# Patient Record
Sex: MALE | Race: WHITE | NOT HISPANIC OR LATINO | Employment: OTHER | URBAN - NONMETROPOLITAN AREA
[De-identification: names, ages, dates, MRNs, and addresses within clinical notes are randomized per-mention and may not be internally consistent; named-entity substitution may affect disease eponyms.]

---

## 2017-05-27 ENCOUNTER — TELEPHONE (OUTPATIENT)
Dept: CARDIOLOGY | Facility: CLINIC | Age: 77
End: 2017-05-27

## 2017-05-27 RX ORDER — CARVEDILOL 3.12 MG/1
3.12 TABLET ORAL 2 TIMES DAILY
Qty: 60 TABLET | Refills: 0 | Status: ON HOLD | OUTPATIENT
Start: 2017-05-27 | End: 2018-11-07

## 2017-07-07 ENCOUNTER — TRANSCRIBE ORDERS (OUTPATIENT)
Dept: GENERAL RADIOLOGY | Facility: CLINIC | Age: 77
End: 2017-07-07

## 2017-07-07 DIAGNOSIS — J44.9 CHRONIC OBSTRUCTIVE PULMONARY DISEASE, UNSPECIFIED COPD TYPE (HCC): Primary | ICD-10-CM

## 2017-07-10 ENCOUNTER — TRANSCRIBE ORDERS (OUTPATIENT)
Dept: GENERAL RADIOLOGY | Facility: CLINIC | Age: 77
End: 2017-07-10

## 2017-07-10 DIAGNOSIS — J98.4 OTHER DISORDERS OF LUNG: Primary | ICD-10-CM

## 2017-08-17 ENCOUNTER — OFFICE VISIT (OUTPATIENT)
Dept: CARDIOLOGY | Facility: CLINIC | Age: 77
End: 2017-08-17

## 2017-08-17 VITALS
SYSTOLIC BLOOD PRESSURE: 94 MMHG | WEIGHT: 147.4 LBS | DIASTOLIC BLOOD PRESSURE: 56 MMHG | BODY MASS INDEX: 22.34 KG/M2 | HEIGHT: 68 IN | OXYGEN SATURATION: 90 % | HEART RATE: 74 BPM

## 2017-08-17 DIAGNOSIS — E78.5 HYPERLIPIDEMIA, UNSPECIFIED HYPERLIPIDEMIA TYPE: ICD-10-CM

## 2017-08-17 DIAGNOSIS — I71.22 ANEURYSM OF AORTIC ARCH (HCC): ICD-10-CM

## 2017-08-17 DIAGNOSIS — I10 ESSENTIAL HYPERTENSION: ICD-10-CM

## 2017-08-17 DIAGNOSIS — I25.10 CORONARY ARTERY DISEASE INVOLVING NATIVE CORONARY ARTERY OF NATIVE HEART WITHOUT ANGINA PECTORIS: Primary | ICD-10-CM

## 2017-08-17 PROCEDURE — 99213 OFFICE O/P EST LOW 20 MIN: CPT | Performed by: INTERNAL MEDICINE

## 2017-08-17 RX ORDER — ALENDRONATE SODIUM 70 MG/1
70 TABLET ORAL DAILY
Refills: 6 | COMMUNITY
Start: 2017-06-12

## 2017-08-17 RX ORDER — LISINOPRIL 20 MG/1
20 TABLET ORAL DAILY
Qty: 30 TABLET | Refills: 6 | Status: SHIPPED | OUTPATIENT
Start: 2017-08-17 | End: 2017-08-30

## 2017-08-17 RX ORDER — ATORVASTATIN CALCIUM 20 MG/1
20 TABLET, FILM COATED ORAL NIGHTLY
Refills: 6 | COMMUNITY
Start: 2017-07-11 | End: 2017-08-30

## 2017-08-17 RX ORDER — TAMSULOSIN HYDROCHLORIDE 0.4 MG/1
1 CAPSULE ORAL DAILY
Refills: 6 | COMMUNITY
Start: 2017-07-11

## 2017-08-17 RX ORDER — TIOTROPIUM BROMIDE 18 UG/1
CAPSULE ORAL; RESPIRATORY (INHALATION)
Refills: 6 | COMMUNITY
Start: 2017-07-11

## 2017-08-17 RX ORDER — LISINOPRIL 40 MG/1
40 TABLET ORAL DAILY
Refills: 6 | COMMUNITY
Start: 2017-07-11 | End: 2017-08-17 | Stop reason: SDUPTHER

## 2017-08-17 RX ORDER — ASPIRIN 81 MG/1
81 TABLET ORAL DAILY
COMMUNITY

## 2017-08-17 RX ORDER — CARVEDILOL 3.12 MG/1
3.12 TABLET ORAL 2 TIMES DAILY WITH MEALS
Refills: 7 | COMMUNITY
Start: 2017-07-28

## 2017-08-17 RX ORDER — HYDROCHLOROTHIAZIDE 12.5 MG/1
12.5 CAPSULE, GELATIN COATED ORAL EVERY MORNING
COMMUNITY

## 2017-08-17 RX ORDER — PHENOL 1.4 %
600 AEROSOL, SPRAY (ML) MUCOUS MEMBRANE 2 TIMES DAILY WITH MEALS
COMMUNITY

## 2017-08-17 RX ORDER — AMLODIPINE BESYLATE 10 MG/1
10 TABLET ORAL DAILY
Refills: 6 | COMMUNITY
Start: 2017-07-11

## 2017-08-21 PROBLEM — I10 ESSENTIAL HYPERTENSION: Status: ACTIVE | Noted: 2017-08-21

## 2017-08-21 PROBLEM — E78.5 HYPERLIPIDEMIA: Status: ACTIVE | Noted: 2017-08-21

## 2017-08-21 PROBLEM — I25.10 CORONARY ARTERY DISEASE INVOLVING NATIVE CORONARY ARTERY OF NATIVE HEART WITHOUT ANGINA PECTORIS: Status: ACTIVE | Noted: 2017-08-21

## 2017-08-21 PROBLEM — I71.22 ANEURYSM OF AORTIC ARCH (HCC): Status: ACTIVE | Noted: 2017-08-21

## 2017-09-05 ENCOUNTER — TELEPHONE (OUTPATIENT)
Dept: CARDIAC SURGERY | Facility: CLINIC | Age: 77
End: 2017-09-05

## 2017-09-05 NOTE — TELEPHONE ENCOUNTER
"CALLED PT TO RESCHEDULE APPOINTMENT BECAUSE HE MISSED TODAY'S APPOINTMENT. SPOKE TO HIS WIFE AND SHE STATES \"WE ARE TO OLD TO MAKE THAT DRIVE TO [place]\" AND WISHES TO CANCEL APT AT THIS TIME. SO I INSTRUCTED PT WIFE TO CALL DR SIERRA OFFICE SO THAT OTHER ARRANGEMENTS CAN POSSIBLY BE MADE.   "

## 2017-09-26 ENCOUNTER — OFFICE VISIT (OUTPATIENT)
Dept: CARDIAC SURGERY | Facility: CLINIC | Age: 77
End: 2017-09-26

## 2017-09-26 VITALS
BODY MASS INDEX: 21.82 KG/M2 | WEIGHT: 144 LBS | DIASTOLIC BLOOD PRESSURE: 70 MMHG | HEIGHT: 68 IN | RESPIRATION RATE: 20 BRPM | HEART RATE: 79 BPM | TEMPERATURE: 98.2 F | OXYGEN SATURATION: 90 % | SYSTOLIC BLOOD PRESSURE: 125 MMHG

## 2017-09-26 DIAGNOSIS — J43.8 OTHER EMPHYSEMA (HCC): ICD-10-CM

## 2017-09-26 DIAGNOSIS — I10 ESSENTIAL HYPERTENSION: ICD-10-CM

## 2017-09-26 DIAGNOSIS — I25.10 CORONARY ARTERY DISEASE INVOLVING NATIVE CORONARY ARTERY OF NATIVE HEART WITHOUT ANGINA PECTORIS: ICD-10-CM

## 2017-09-26 DIAGNOSIS — R06.09 DYSPNEA ON EXERTION: ICD-10-CM

## 2017-09-26 DIAGNOSIS — I71.22 ANEURYSM OF AORTIC ARCH (HCC): Primary | ICD-10-CM

## 2017-09-26 PROCEDURE — 99204 OFFICE O/P NEW MOD 45 MIN: CPT | Performed by: THORACIC SURGERY (CARDIOTHORACIC VASCULAR SURGERY)

## 2017-10-01 PROBLEM — R06.09 DYSPNEA ON EXERTION: Status: ACTIVE | Noted: 2017-10-01

## 2017-10-01 PROBLEM — J43.8 OTHER EMPHYSEMA (HCC): Status: ACTIVE | Noted: 2017-10-01

## 2017-10-01 NOTE — PROGRESS NOTES
9/26/17    Reason for consultation:     Evaluation of thoracic aortic aneurysm    Chief Complaint     Same    History of Present Illness:       Dear Dr. Matamoros and Colleagues,  It was nice to see [place] Seema Gonzalez in consultation at your request. He is a 77 y.o. male with a history of CAD and PCIs and COPD on home O2 who was found incidentally a saccular distal arch aortic aneurysm. He denies chest or upper back pain, syncope, TIA, hoarseness, fever, chills or embolic signs to LEs. His recent MRA showed a 3.1 cm x 2.2 cm saccular arch aneurysm without rupture or surrounding inflammation. He has no family history of aneurysms or dissections..     Patient Active Problem List   Diagnosis   • Aneurysm of aortic arch   • Coronary artery disease involving native coronary artery of native heart without angina pectoris   • Hyperlipidemia   • Essential hypertension   • Other emphysema   • Dyspnea on exertion       Past Medical History:   Diagnosis Date   • Asthma    • Carotid bruit    • COPD (chronic obstructive pulmonary disease)     on supplemental O2      • Coronary arteriosclerosis     recent stent to RCA patent      • Degenerative joint disease involving multiple joints    • Emphysema lung    • Essential hypertension    • Hiatal hernia    • History of EKG 05/05/2012   • Hyperlipidemia    • Impaired fasting glucose    • 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 Total occlusion of the distal right coronary artery   • COLONOSCOPY  2009   • CORONARY STENT PLACEMENT      x1   • ECHO - CONVERTED  04/16/2012    NRL LV syst func. EF above 65%. There is no evidence of regional wall motion abn.  Mild concentric LVH.There is moderate TR valve regurg.No evodence of pericardial effusion   • KYPHOPLASTY      Percutaneous vertebral augmentation (kyphoplasty) (1)         No Known Allergies      Current Outpatient Prescriptions:   •  alendronate (FOSAMAX) 70 MG tablet, Take 70 mg by mouth Daily., Disp: , Rfl: 6  •  amLODIPine (NORVASC) 10 MG tablet, 10 mg Daily., Disp: , Rfl: 6  •  amLODIPine (NORVASC) 10 MG tablet, Take 10 mg by mouth., Disp: , Rfl:   •  aspirin 81 MG EC tablet, Take 81 mg by mouth Daily., Disp: , Rfl:   •  atorvastatin (LIPITOR) 20 MG tablet, Take 20 mg by mouth., Disp: , Rfl:   •  calcium carbonate (OS-NGOC) 600 MG tablet, Take 600 mg by mouth 2 (Two) Times a Day With Meals., Disp: , Rfl:   •  carvedilol (COREG) 3.125 MG tablet, 3.125 mg 2 (Two) Times a Day With Meals., Disp: , Rfl: 7  •  esomeprazole (NEXIUM) 40 MG capsule, Take 40 mg by mouth., Disp: , Rfl:   •  fluticasone-salmeterol (ADVAIR) 500-50 MCG/DOSE DISKUS, Inhale 1 puff., Disp: , Rfl:   •  hydrochlorothiazide (MICROZIDE) 12.5 MG capsule, Take 12.5 mg by mouth Every Morning., Disp: , Rfl:   •  lisinopril (PRINIVIL,ZESTRIL) 40 MG tablet, Take 40 mg by mouth., Disp: , Rfl:   •  PROAIR  (90 Base) MCG/ACT inhaler, , Disp: , Rfl: 6  •  SPIRIVA HANDIHALER 18 MCG per inhalation capsule, , Disp: , Rfl: 6  •  tamsulosin (FLOMAX) 0.4 MG capsule 24 hr capsule, 1 capsule Daily., Disp: , Rfl: 6    Social History     Social History   • Marital status:      Spouse name: N/A   • Number of children: N/A   • Years of education: N/A     Occupational History   • Not on file.     Social History Main Topics   • Smoking status: Former Smoker   • Smokeless tobacco: Current User     Types: Snuff   • Alcohol use No   • Drug use: No   • Sexual activity: Defer     Other Topics Concern   • Not on file     Social History Narrative       Family History   Problem Relation Age of Onset   • Cancer Mother    • Heart disease Father    • Diabetes Other       Review of Systems   Constitutional: Positive for fatigue. Negative for fever.   Respiratory: Positive for shortness of breath and wheezing. Negative for chest tightness.    Cardiovascular: Negative for chest pain and leg swelling.   Neurological: Negative for syncope.   All other systems reviewed and are negative.      Physical Exam:    Vital Signs:  Weight: 144 lb (65.3 kg)   Body mass index is 21.9 kg/(m^2).  Temp: 98.2 °F (36.8 °C)   Heart Rate: 79   BP: 125/70     Physical Exam   Constitutional: He is oriented to person, place, and time. He appears well-developed and well-nourished.   HENT:   Head: Normocephalic and atraumatic.   Nose: Nose normal.   Mouth/Throat: Oropharynx is clear and moist.   Eyes: Conjunctivae are normal. Pupils are equal, round, and reactive to light.   Neck: Normal range of motion. Neck supple. No JVD present. No thyromegaly present.   Cardiovascular: Normal rate, regular rhythm, normal heart sounds and intact distal pulses.  PMI is not displaced.  Exam reveals no gallop and no friction rub.    No murmur heard.  Pulses:       Radial pulses are 2+ on the right side, and 2+ on the left side.        Dorsalis pedis pulses are 2+ on the right side, and 2+ on the left side.   Pulmonary/Chest: Effort normal. No accessory muscle usage. No respiratory distress. He has decreased breath sounds in the left lower field. He has wheezes in the right lower field. He has no rales.   Abdominal: Soft. Bowel sounds are normal. He exhibits no distension and no mass. There is no tenderness.   Musculoskeletal: Normal range of motion. He exhibits no tenderness or deformity.   Lymphadenopathy:     He has no cervical adenopathy.   Neurological: He is alert and oriented to person, place, and time. He has normal reflexes.   Skin: Skin is warm and dry. No rash noted. No erythema.   Psychiatric: He has a normal mood and affect. His behavior is normal.   No groin or neck adenomegalies    Relevant studies (other  than HPI)        Assessment:     Problem List Items Addressed This Visit        Cardiovascular and Mediastinum    Aneurysm of aortic arch - Primary    Coronary artery disease involving native coronary artery of native heart without angina pectoris    Essential hypertension       Respiratory    Other emphysema    Dyspnea on exertion        Saccular aortic arch aneurysm, asymptomatic.  Multiple co morbidities    Recommendation/Plan:     I am not sure a stent graft can be placed without debranching both, the LCCA and LSCA. Open surgery is not an option. I discuss the possibility of TEVAR with patient and if feasible, he will consider it. He may need a CTA for further planning.    Thank you for allowing me to participate in his care.    Regards,    Jah Buenrostro M.D.

## 2017-10-02 ENCOUNTER — TELEPHONE (OUTPATIENT)
Dept: CARDIAC SURGERY | Facility: CLINIC | Age: 77
End: 2017-10-02

## 2017-10-02 NOTE — TELEPHONE ENCOUNTER
I spoke with patients wife trying to find out where his most recent labs were drawn. I then called [facility] office [phone] and they will send labs drawn 9/20/17 for Dr Buenrostro to review

## 2017-10-09 ENCOUNTER — TELEPHONE (OUTPATIENT)
Dept: CARDIAC SURGERY | Facility: CLINIC | Age: 77
End: 2017-10-09

## 2017-10-09 NOTE — TELEPHONE ENCOUNTER
I was able to reach Mrs Miller and confirmed her husbands appointment at [facility] 10/11/17 at 11:30 which was agreeable to them

## 2017-10-09 NOTE — TELEPHONE ENCOUNTER
I have made several attempts to reach Mr Miller to let him know the CTA with reconstruction Dr Buenrostro wanted has been ordered and scheduled for 10/10/17 at 11:30 [facility]. The phone number I have reached him on in the past does not have voicemail and I have not been able to get an answer when I call. I'll continue to try to reach him

## 2017-10-11 ENCOUNTER — HOSPITAL ENCOUNTER (OUTPATIENT)
Dept: CT IMAGING | Facility: HOSPITAL | Age: 77
Discharge: HOME | End: 2017-10-11
Admitting: THORACIC SURGERY (CARDIOTHORACIC VASCULAR SURGERY)

## 2017-10-11 DIAGNOSIS — I71.20 THORACIC AORTIC ANEURYSM (HCC): ICD-10-CM

## 2017-10-11 PROCEDURE — 0 IOPAMIDOL PER 1 ML: Performed by: THORACIC SURGERY (CARDIOTHORACIC VASCULAR SURGERY)

## 2017-10-11 PROCEDURE — 71275 CT ANGIOGRAPHY CHEST: CPT

## 2017-10-11 RX ADMIN — IOPAMIDOL 90 ML: 755 INJECTION, SOLUTION INTRAVENOUS at 13:15

## 2017-10-19 ENCOUNTER — TELEPHONE (OUTPATIENT)
Dept: CARDIAC SURGERY | Facility: CLINIC | Age: 77
End: 2017-10-19

## 2017-10-19 NOTE — TELEPHONE ENCOUNTER
I returned Mrs Miller's call and she was asking if Dr Buenrostro had reviewed the recent test results. I believe he has but I'm not sure what the plan is for Mr Miller. I let her know I will discuss with Dr Buenrostro and call her back in the next day or two

## 2017-10-20 ENCOUNTER — TELEPHONE (OUTPATIENT)
Dept: CARDIAC SURGERY | Facility: CLINIC | Age: 77
End: 2017-10-20

## 2017-10-20 NOTE — TELEPHONE ENCOUNTER
Dr Buenrostro asked me to call and let Mr Miller know that he feels surgery is to high risk at this time due to the location of the aneurysm and the co-morbidities. He has reviewed the recent CTA and thinks it is best to continue to follow with Dr Matamoros. He would like to see patient back in office in one year with a CTA of the chest , which the patient can have completed at [facility]. I spoke with Mrs Miller yesterday and she asked me to leave a message on their answering machine if I was unable to reach them, which I did. I left our office number and asked that they call back with any questions

## 2017-10-26 ENCOUNTER — HOSPITAL ENCOUNTER (EMERGENCY)
Facility: HOSPITAL | Age: 77
Discharge: HOME | End: 2017-10-26
Attending: EMERGENCY MEDICINE | Admitting: EMERGENCY MEDICINE

## 2017-10-26 VITALS
BODY MASS INDEX: 21.22 KG/M2 | HEART RATE: 95 BPM | HEIGHT: 68 IN | WEIGHT: 140 LBS | OXYGEN SATURATION: 94 % | DIASTOLIC BLOOD PRESSURE: 55 MMHG | SYSTOLIC BLOOD PRESSURE: 119 MMHG | RESPIRATION RATE: 20 BRPM | TEMPERATURE: 98.2 F

## 2017-10-26 DIAGNOSIS — J44.9 CHRONIC OBSTRUCTIVE PULMONARY DISEASE, UNSPECIFIED COPD TYPE (HCC): ICD-10-CM

## 2017-10-26 DIAGNOSIS — R04.0 EPISTAXIS: Primary | ICD-10-CM

## 2017-10-26 LAB
APTT PPP: 30.3 SECONDS (ref 20–40.3)
BASOPHILS # BLD AUTO: 0.1 10*3/MM3 (ref 0–0.2)
BASOPHILS NFR BLD AUTO: 0.7 % (ref 0–2)
DEPRECATED RDW RBC AUTO: 48.3 FL (ref 35.1–43.9)
EOSINOPHIL # BLD AUTO: 0.21 10*3/MM3 (ref 0–0.7)
EOSINOPHIL NFR BLD AUTO: 1.4 % (ref 0–7)
ERYTHROCYTE [DISTWIDTH] IN BLOOD BY AUTOMATED COUNT: 14 % (ref 11.5–14.5)
HCT VFR BLD AUTO: 37.6 % (ref 39–49)
HGB BLD-MCNC: 12.6 G/DL (ref 13.7–17.3)
IMM GRANULOCYTES # BLD: 0.06 10*3/MM3 (ref 0–0.02)
IMM GRANULOCYTES NFR BLD: 0.4 % (ref 0–0.5)
INR PPP: 1.16 (ref 0.8–1.2)
LYMPHOCYTES # BLD AUTO: 1.83 10*3/MM3 (ref 0.6–4.2)
LYMPHOCYTES NFR BLD AUTO: 12.5 % (ref 10–50)
MCH RBC QN AUTO: 32.1 PG (ref 26.5–34)
MCHC RBC AUTO-ENTMCNC: 33.5 G/DL (ref 31.5–36.3)
MCV RBC AUTO: 95.9 FL (ref 80–98)
MONOCYTES # BLD AUTO: 0.84 10*3/MM3 (ref 0–0.9)
MONOCYTES NFR BLD AUTO: 5.7 % (ref 0–12)
NEUTROPHILS # BLD AUTO: 11.6 10*3/MM3 (ref 2–8.6)
NEUTROPHILS NFR BLD AUTO: 79.3 % (ref 37–80)
PLATELET # BLD AUTO: 145 10*3/MM3 (ref 150–450)
PMV BLD AUTO: 10 FL (ref 8–12)
PROTHROMBIN TIME: 14.8 SECONDS (ref 11.1–15.3)
RBC # BLD AUTO: 3.92 10*6/MM3 (ref 4.37–5.74)
WBC NRBC COR # BLD: 14.64 10*3/MM3 (ref 3.2–9.8)

## 2017-10-26 PROCEDURE — 85610 PROTHROMBIN TIME: CPT | Performed by: EMERGENCY MEDICINE

## 2017-10-26 PROCEDURE — 85025 COMPLETE CBC W/AUTO DIFF WBC: CPT | Performed by: EMERGENCY MEDICINE

## 2017-10-26 PROCEDURE — 30901 CONTROL OF NOSEBLEED: CPT

## 2017-10-26 PROCEDURE — 85730 THROMBOPLASTIN TIME PARTIAL: CPT | Performed by: EMERGENCY MEDICINE

## 2017-10-26 PROCEDURE — 99284 EMERGENCY DEPT VISIT MOD MDM: CPT

## 2017-10-26 RX ORDER — AMOXICILLIN AND CLAVULANATE POTASSIUM 875; 125 MG/1; MG/1
1 TABLET, FILM COATED ORAL 2 TIMES DAILY
Qty: 14 TABLET | Refills: 0 | Status: SHIPPED | OUTPATIENT
Start: 2017-10-26 | End: 2017-11-02

## 2017-10-26 RX ORDER — SODIUM CHLORIDE 0.9 % (FLUSH) 0.9 %
10 SYRINGE (ML) INJECTION AS NEEDED
Status: DISCONTINUED | OUTPATIENT
Start: 2017-10-26 | End: 2017-10-26 | Stop reason: HOSPADM

## 2017-10-26 RX ORDER — IPRATROPIUM BROMIDE AND ALBUTEROL SULFATE 2.5; .5 MG/3ML; MG/3ML
3 SOLUTION RESPIRATORY (INHALATION)
Status: DISCONTINUED | OUTPATIENT
Start: 2017-10-26 | End: 2017-10-26 | Stop reason: HOSPADM

## 2017-10-26 RX ADMIN — SILVER NITRATE APPLICATORS: 25; 75 STICK TOPICAL at 17:00

## 2017-10-26 RX ADMIN — PHENYLEPHRINE HYDROCHLORIDE 2 SPRAY: 0.5 SPRAY NASAL at 16:00

## 2017-10-26 NOTE — DISCHARGE INSTRUCTIONS
Return ED 10.29.2017 for Rhino Rocket removal.  Return ED fever, pain, bleeding, shortness of air, worse condition, other concerns.  Hold Aspirin pending ENT followup.

## 2017-10-26 NOTE — ED PROVIDER NOTES
Subjective   HPI Comments: 76yo male pmh significant htn/o2 dependent copd presents ED c/o 4hr hx left nares epistaxis onset after blowing nose.  ROS otherwise noncontributory.    Patient is a 77 y.o. male presenting with nosebleeds.   Nose Bleed   Location:  L nare  Severity:  Moderate  Duration:  4 hours  Timing:  Constant  Chronicity:  New  Relieved by:  Nothing  Worsened by:  Nothing  Associated symptoms: congestion        Review of Systems   Constitutional: Negative.    HENT: Positive for congestion and nosebleeds.    Eyes: Negative.    Respiratory: Negative.    Cardiovascular: Negative.    Gastrointestinal: Negative.    Endocrine: Negative.    Psychiatric/Behavioral: Negative for suicidal ideas.       Past Medical History:   Diagnosis Date   • COPD (chronic obstructive pulmonary disease)    • Hypertension        No Known Allergies    Past Surgical History:   Procedure Laterality Date   • CORONARY ANGIOPLASTY WITH STENT PLACEMENT         History reviewed. No pertinent family history.    Social History     Social History   • Marital status:      Spouse name: N/A   • Number of children: N/A   • Years of education: N/A     Social History Main Topics   • Smoking status: Never Smoker   • Smokeless tobacco: Never Used   • Alcohol use No   • Drug use: No   • Sexual activity: Not Asked     Other Topics Concern   • None     Social History Narrative   • None           Objective   Physical Exam   Constitutional: He is oriented to person, place, and time. He appears well-nourished.   HENT:   Head: Normocephalic and atraumatic.   Right Ear: External ear normal.   Left Ear: External ear normal.   Nose: Epistaxis is observed.       Mouth/Throat: Oropharynx is clear and moist.   Eyes: Pupils are equal, round, and reactive to light.   Neck: Neck supple. No JVD present. No tracheal deviation present.   Cardiovascular: Normal rate, regular rhythm, normal heart sounds and intact distal pulses.  Exam reveals no gallop and no  friction rub.    No murmur heard.  Pulmonary/Chest: Effort normal and breath sounds normal. He has no wheezes. He has no rales.   Abdominal: Soft. Bowel sounds are normal. There is no tenderness. There is no rebound and no guarding.   Musculoskeletal: He exhibits no edema.   Lymphadenopathy:     He has no cervical adenopathy.   Neurological: He is alert and oriented to person, place, and time.   Skin: Skin is warm and dry.   Nursing note and vitals reviewed.      Epistaxis Management  Date/Time: 10/26/2017 5:11 PM  Performed by: DEVEN VELASCO  Authorized by: DEVEN VELASCO   Consent: Verbal consent obtained.    Sedation:  Patient sedated: no  Treatment site: left anterior  Repair method: silver nitrate and nasal balloon  Post-procedure assessment: bleeding stopped  Treatment complexity: simple  Patient tolerance: Patient tolerated the procedure well with no immediate complications               ED Course  ED Course   Comment By Time   Dr. Silva consulted. Will see patient outpatient. Plan ED discharge if labs stable et no further hemorrhage. Deven Velasco MD 10/26 7322      Labs Reviewed   CBC WITH AUTO DIFFERENTIAL - Abnormal; Notable for the following:        Result Value    WBC 14.64 (*)     RBC 3.92 (*)     Hemoglobin 12.6 (*)     Hematocrit 37.6 (*)     RDW-SD 48.3 (*)     Platelets 145 (*)     Neutrophils, Absolute 11.60 (*)     Immature Grans, Absolute 0.06 (*)     All other components within normal limits   PROTIME-INR - Normal    Narrative:     Therapeutic range for most indications is 2.0-3.0 INR,  or 2.5-3.5 for mechanical heart valves.   APTT - Normal    Narrative:     The recommended Heparin therapeutic range is 68-97 seconds.   CBC AND DIFFERENTIAL    Narrative:     The following orders were created for panel order CBC & Differential.  Procedure                               Abnormality         Status                     ---------                               -----------         ------                      CBC Auto Differential[[id number]]        Abnormal            Final result                 Please view results for these tests on the individual orders.                 MDM    Final diagnoses:   Epistaxis   Chronic obstructive pulmonary disease, unspecified COPD type            Sha Mckee MD  10/26/17 6460

## 2017-10-26 NOTE — ED NOTES
Nose clamp and gauze placed on patient at this time. Patient states this has happened before. Patient does have to wear O2 at home and states he feels like his nose is dry. Patient states that his nose has been bleeding for over 4 hours now.     Va Johnson RN  10/26/17 8260

## 2017-10-29 ENCOUNTER — HOSPITAL ENCOUNTER (EMERGENCY)
Facility: HOSPITAL | Age: 77
Discharge: HOME | End: 2017-10-29
Attending: FAMILY MEDICINE | Admitting: FAMILY MEDICINE

## 2017-10-29 VITALS
WEIGHT: 143 LBS | RESPIRATION RATE: 24 BRPM | SYSTOLIC BLOOD PRESSURE: 116 MMHG | HEIGHT: 68 IN | BODY MASS INDEX: 21.67 KG/M2 | OXYGEN SATURATION: 91 % | DIASTOLIC BLOOD PRESSURE: 56 MMHG | TEMPERATURE: 97.9 F | HEART RATE: 93 BPM

## 2017-10-29 DIAGNOSIS — R04.0 ANTERIOR EPISTAXIS: Primary | ICD-10-CM

## 2017-10-29 PROCEDURE — 99201: CPT

## 2017-11-03 ENCOUNTER — OFFICE VISIT (OUTPATIENT)
Dept: OTOLARYNGOLOGY | Facility: CLINIC | Age: 77
End: 2017-11-03

## 2017-11-03 VITALS — BODY MASS INDEX: 22.13 KG/M2 | WEIGHT: 146 LBS | HEIGHT: 68 IN | TEMPERATURE: 96.5 F

## 2017-11-03 DIAGNOSIS — R04.0 EPISTAXIS: Primary | ICD-10-CM

## 2017-11-03 DIAGNOSIS — J34.2 NASAL SEPTAL DEVIATION: ICD-10-CM

## 2017-11-03 PROCEDURE — 31231 NASAL ENDOSCOPY DX: CPT | Performed by: OTOLARYNGOLOGY

## 2017-11-03 PROCEDURE — 99203 OFFICE O/P NEW LOW 30 MIN: CPT | Performed by: OTOLARYNGOLOGY

## 2017-11-03 RX ORDER — AMOXICILLIN AND CLAVULANATE POTASSIUM 875; 125 MG/1; MG/1
1 TABLET, FILM COATED ORAL 2 TIMES DAILY
Status: ON HOLD | COMMUNITY
End: 2018-11-07

## 2017-11-03 NOTE — PROGRESS NOTES
Subjective   Montana Gonzalez is a 77 y.o. male.   Chief complaint follow-up left epistaxis seen in emergency room previously  History of Present Illness     Patient is had intermittent nosebleeds all his life but says he has severe nosebleed about a week ago once the emergency room where she nose is packed is no cell congestion uncomfortable since then the packing was take A few days later.  He's had no further bleeding no facial swelling.    The following portions of the patient's history were reviewed and updated as appropriate: allergies, current medications, past family history, past medical history, past social history, past surgical history and problem list.      Montana Gonzalez reports that he has quit smoking. His smokeless tobacco use includes Snuff. He reports that he does not drink alcohol or use illicit drugs.  Patient is not a tobacco user and has been counseled for use of tobacco products    Family History   Problem Relation Age of Onset   • Cancer Mother    • Heart disease Father    • Diabetes Other          Current Outpatient Prescriptions:   •  amLODIPine (NORVASC) 10 MG tablet, 10 mg Daily., Disp: , Rfl: 6  •  amLODIPine (NORVASC) 10 MG tablet, Take 10 mg by mouth., Disp: , Rfl:   •  amoxicillin-clavulanate (AUGMENTIN) 875-125 MG per tablet, Take 1 tablet by mouth 2 (Two) Times a Day., Disp: , Rfl:   •  aspirin 81 MG EC tablet, Take 81 mg by mouth Daily., Disp: , Rfl:   •  atorvastatin (LIPITOR) 20 MG tablet, Take 20 mg by mouth., Disp: , Rfl:   •  calcium carbonate (OS-NGOC) 600 MG tablet, Take 600 mg by mouth 2 (Two) Times a Day With Meals., Disp: , Rfl:   •  carvedilol (COREG) 3.125 MG tablet, 3.125 mg 2 (Two) Times a Day With Meals., Disp: , Rfl: 7  •  esomeprazole (NEXIUM) 40 MG capsule, Take 40 mg by mouth., Disp: , Rfl:   •  fluticasone-salmeterol (ADVAIR) 500-50 MCG/DOSE DISKUS, Inhale 1 puff., Disp: , Rfl:   •  hydrochlorothiazide (MICROZIDE) 12.5 MG capsule, Take 12.5 mg by  mouth Every Morning., Disp: , Rfl:   •  lisinopril (PRINIVIL,ZESTRIL) 40 MG tablet, Take 40 mg by mouth., Disp: , Rfl:   •  PROAIR  (90 Base) MCG/ACT inhaler, , Disp: , Rfl: 6  •  SPIRIVA HANDIHALER 18 MCG per inhalation capsule, , Disp: , Rfl: 6  •  tamsulosin (FLOMAX) 0.4 MG capsule 24 hr capsule, 1 capsule Daily., Disp: , Rfl: 6  •  alendronate (FOSAMAX) 70 MG tablet, Take 70 mg by mouth Daily., Disp: , Rfl: 6    No Known Allergies    Past Medical History:   Diagnosis Date   • Aorta aneurysm    • Asthma    • Bleeding tendency    • Carotid bruit    • COPD (chronic obstructive pulmonary disease)     on supplemental O2      • Coronary arteriosclerosis     recent stent to RCA patent      • Degenerative joint disease involving multiple joints    • Emphysema lung    • Essential hypertension    • Hiatal hernia    • History of EKG 05/05/2012   • Hyperlipidemia    • Impaired fasting glucose    • 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   HENT: Positive for hearing loss and nosebleeds. Negative for ear discharge, ear pain and sore throat.    Eyes: Positive for visual disturbance.   Respiratory: Positive for cough and shortness of breath.    Musculoskeletal: Positive for arthralgias.   All other systems reviewed and are negative.          Objective   Physical Exam   Constitutional: He is oriented to person, place, and time. He appears well-developed and well-nourished.   HENT:   Head: Normocephalic and atraumatic.   Right Ear: Tympanic membrane, external ear and ear canal normal.   Left Ear: Tympanic membrane, external ear and ear canal normal.   Nose: Nasal deformity and septal deviation present. No mucosal edema, rhinorrhea or nasal septal hematoma. No epistaxis.  Right sinus exhibits no maxillary sinus tenderness and no frontal sinus tenderness. Left sinus exhibits no maxillary sinus tenderness and no frontal sinus tenderness.       Mouth/Throat: Uvula is midline and oropharynx is clear and moist. No trismus in the jaw. Normal dentition. No oropharyngeal exudate or posterior oropharyngeal edema.   Eyes: Conjunctivae and EOM are normal. Pupils are equal, round, and reactive to light.   Neck: Normal range of motion. Neck supple. No JVD present. No tracheal deviation present. No thyromegaly present.   Cardiovascular: Normal rate.    Pulmonary/Chest: Effort normal.   Musculoskeletal: Normal range of motion.   Lymphadenopathy:        Head (right side): No submental, no submandibular, no tonsillar, no preauricular, no posterior auricular and no occipital adenopathy present.        Head (left side): No submental, no submandibular, no tonsillar, no preauricular, no posterior auricular and no occipital adenopathy present.     He has no cervical adenopathy.        Right cervical: No superficial cervical, no deep cervical and no posterior cervical adenopathy present.       Left cervical: No superficial cervical, no deep cervical and no posterior cervical adenopathy present.   Neurological: He is alert and oriented to person, place, and time. No cranial nerve deficit.   Skin: Skin is warm.   Psychiatric: He has a normal mood and affect. His speech is normal and behavior is normal. Thought content normal.   Nursing note and vitals reviewed.        Procedure note nasal endoscopy was done for the indication of epistaxis the patient feels like his nose is abnormal since he's had his nose packed.  After getting consent local anesthetic and Afrin was placed and nose.  Endoscopy was carried out no polyps are seen there is dry crusting abrasion on the inferior turbinate extending to about snf past the nose.  Patient does have a deviated septum more on the right than the left but there is no  actual bleeding site seen and no polyps or active bleeding noted            Assessment/Plan   Montana was seen today for nose bleed.    Diagnoses and all orders for this visit:    Epistaxis    Nasal septal deviation      Just the patient come back in a few weeks to reevaluate.   Scan asked strategies to help minimize nosebleeds including the medication with his oxygen and  suggested a humidifier at home he and his wife.  Supports a nasal saline irrigation.  Acid not bending or lifting for at least 5 more days.  Call for questions or problems otherwise will reassess.  He's not interested in any surgery to help his nasal breathing and deviated septum and we'll be happy to see him he has further problems with his bleeding.

## 2017-11-08 ENCOUNTER — TELEPHONE (OUTPATIENT)
Dept: OTOLARYNGOLOGY | Facility: CLINIC | Age: 77
End: 2017-11-08

## 2017-11-08 NOTE — TELEPHONE ENCOUNTER
----- Message from Sloane Velásquez sent at 11/8/2017 10:36 AM CST -----  Contact: [phone]  WANTS TO KNOW WHEN HE CAN START BACK ON HIS ASPIRIN  WAS SEEN LAST WEEK FOR NOSEBLEEDS    Called patient to let them know that Dr. Silva said he could start his aspirin back on Monday.    They are to call our office if they have any questions or concerns.

## 2018-01-03 ENCOUNTER — APPOINTMENT (OUTPATIENT)
Dept: CT IMAGING | Facility: HOSPITAL | Age: 78
End: 2018-01-03

## 2018-01-03 ENCOUNTER — HOSPITAL ENCOUNTER (EMERGENCY)
Facility: HOSPITAL | Age: 78
Discharge: HOME | End: 2018-01-03
Attending: EMERGENCY MEDICINE | Admitting: EMERGENCY MEDICINE

## 2018-01-03 VITALS
HEIGHT: 68 IN | WEIGHT: 140 LBS | OXYGEN SATURATION: 92 % | SYSTOLIC BLOOD PRESSURE: 109 MMHG | RESPIRATION RATE: 18 BRPM | TEMPERATURE: 98.9 F | BODY MASS INDEX: 21.22 KG/M2 | DIASTOLIC BLOOD PRESSURE: 55 MMHG | HEART RATE: 91 BPM

## 2018-01-03 DIAGNOSIS — M54.2 NECK PAIN: Primary | ICD-10-CM

## 2018-01-03 DIAGNOSIS — M62.838 CERVICAL PARASPINAL MUSCLE SPASM: ICD-10-CM
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
## 2018-01-03 PROCEDURE — 99283 EMERGENCY DEPT VISIT LOW MDM: CPT

## 2018-01-03 PROCEDURE — 72125 CT NECK SPINE W/O DYE: CPT

## 2018-01-03 RX ORDER — ACETAMINOPHEN 500 MG
500 TABLET ORAL EVERY 6 HOURS PRN
Qty: 20 TABLET | Refills: 0 | Status: SHIPPED | OUTPATIENT
Start: 2018-01-03 | End: 2018-11-09 | Stop reason: HOSPADM

## 2018-01-03 RX ORDER — CYCLOBENZAPRINE HCL 5 MG
5 TABLET ORAL 3 TIMES DAILY PRN
Qty: 9 TABLET | Refills: 0 | Status: SHIPPED | OUTPATIENT
Start: 2018-01-03
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
## 2018-02-08 NOTE — PROGRESS NOTES
"Refusing 02 sat check due to \"fingers all cracked and bleeding\", no bleeding noted.  " Chief complaint : Coronary artery disease    History of Present Illness very pleasant 77-year-old gentleman who comes today for follow-up and cardiac evaluation.  Patient was told that he has an aneurysm that was incidentally discovered during a CT scan investigation.  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  It is 2.2 x 3.1 x 1.7 cm.    Patient has no chest pain or chest discomfort he has no shortness of breath orthopnea or PND.         Review of Systems   Constitution: Negative. Negative for decreased appetite, diaphoresis, weakness, night sweats, weight gain and weight loss.   HENT: Negative for headaches, hearing loss, nosebleeds and sore throat.    Eyes: Negative.  Negative for blurred vision and photophobia.   Cardiovascular: Negative for chest pain, claudication, dyspnea on exertion, irregular heartbeat, leg swelling, palpitations, paroxysmal nocturnal dyspnea and syncope.   Respiratory: Negative for cough, hemoptysis, shortness of breath and wheezing.    Endocrine: Negative for cold intolerance, heat intolerance, polydipsia, polyphagia and polyuria.   Hematologic/Lymphatic: Negative.    Skin: Negative for color change, dry skin, flushing, itching and rash.   Musculoskeletal: Negative.  Negative for muscle cramps, muscle weakness and myalgias.   Gastrointestinal: Negative for abdominal pain, change in bowel habit, diarrhea, hematemesis, melena, nausea and vomiting.   Genitourinary: Negative for dysuria, frequency and hematuria.   Neurological: Negative for dizziness, focal weakness, light-headedness, loss of balance, numbness and seizures.   Psychiatric/Behavioral: Negative.  Negative for substance abuse, suicidal ideas and thoughts of violence.   Allergic/Immunologic: Negative.        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 His smokeless tobacco use includes Snuff. He reports that he does not drink alcohol or use illicit drugs.    Past Surgical History:   Procedure Laterality Date   • CARDIAC CATHETERIZATION  04/16/2012    Patent right coronary artery stents with no in-stent restenosis or stent thrombosis. Total occlusion of the distal right coronary artery   • COLONOSCOPY  2009   • CORONARY STENT PLACEMENT      x1   • ECHO - CONVERTED  04/16/2012    NRL LV syst func. EF above 65%. There is no evidence of regional wall motion abn. Mild concentric LVH.There is moderate TR valve regurg.No evodence of pericardial effusion   • KYPHOPLASTY      Percutaneous vertebral augmentation (kyphoplasty) (1)         No Known Allergies    Current Outpatient Prescriptions   Medication Sig Dispense Refill   • ADVAIR DISKUS 500-50 MCG/DOSE DISKUS   6   • alendronate (FOSAMAX) 70 MG tablet Take 70 mg by mouth Daily.  6   • amLODIPine (NORVASC) 10 MG tablet 10 mg Daily.  6   • aspirin 81 MG EC tablet Take 81 mg by mouth Daily.     • atorvastatin (LIPITOR) 20 MG tablet 20 mg Every Night.  6   • calcium carbonate (OS-NGOC) 600 MG tablet Take 600 mg by mouth 2 (Two) Times a Day With Meals.     • carvedilol (COREG) 3.125 MG tablet 3.125 mg 2 (Two) Times a Day With Meals.  7   • hydrochlorothiazide (MICROZIDE) 12.5 MG capsule Take 12.5 mg by  "mouth Every Morning.     • lisinopril (PRINIVIL,ZESTRIL) 20 MG tablet Take 1 tablet by mouth Daily. 30 tablet 6   • PROAIR  (90 Base) MCG/ACT inhaler   6   • SPIRIVA HANDIHALER 18 MCG per inhalation capsule   6   • tamsulosin (FLOMAX) 0.4 MG capsule 24 hr capsule 1 capsule Daily.  6     No current facility-administered medications for this visit.        Objective     Vital Signs     8/17/17   BP 94/56   Heart Rate 74   SpO2 90 %   Weight 147 lb 6.4 oz (66.9 kg)   Height 68\" (172.7 cm)   BMI (Calculated) 22.4          Physical Exam   Constitutional: He is oriented to person, place, and time. He appears well-developed and well-nourished.   HENT:   Head: Normocephalic and atraumatic.   Eyes: Conjunctivae and EOM are normal. Pupils are equal, round, and reactive to light.   Neck: Neck supple. No JVD present. Carotid bruit is not present. No tracheal deviation and no edema present.   Cardiovascular: Normal rate, regular rhythm, S1 normal, S2 normal, normal heart sounds and intact distal pulses.  Exam reveals no gallop, no S3, no S4 and no friction rub.    No murmur heard.  Pulmonary/Chest: Effort normal and breath sounds normal. He has no wheezes. He has no rales. He exhibits no tenderness.   Abdominal: Bowel sounds are normal. He exhibits no abdominal bruit and no pulsatile midline mass. There is no rebound and no guarding.   Musculoskeletal: Normal range of motion. He exhibits no edema.   Neurological: He is alert and oriented to person, place, and time.   Skin: Skin is warm and dry.   Psychiatric: He has a normal mood and affect.         ECG 12 Lead  Date/Time: 8/17/2017 5:38 PM  Performed by: JAMES SIERRA  Authorized by: JAMES SIERRA   Previous ECG: no previous ECG available  Rhythm: sinus rhythm  Rate: normal  BPM: 67  Conduction: conduction normal  ST Segments: ST segments normal  T Waves: T waves normal  QRS axis: normal  Other: no other findings  Clinical impression: normal ECG            Assessment/Plan "     Patient Active Problem List   Diagnosis   • Aneurysm of aortic arch   • Coronary artery disease involving native coronary artery of native heart without angina pectoris   • Hyperlipidemia   • Essential hypertension     CAD:   April 28, 2012: 2.5 x 28 mm Xience drug-eluting stent was deployed to the right coronary artery      Plan:  The aneurysm that was identified incidentally on CT scan does not appear to be significant in size.  However I will obtain the actual CT and MRA images and consult CT surgery.  I will cut back his lisinopril to 20 mg by mouth daily.  We'll continue with guideline direct medical therapy.    I discussed the patients findings and my recommendations with patient.          This document has been electronically signed by Salome Matamoros MD on August 21, 2017 5:06 PM     Salome Matamoros MD  08/21/17  5:05 PM

## 2018-11-07 ENCOUNTER — HOSPITAL ENCOUNTER (OUTPATIENT)
Facility: HOSPITAL | Age: 78
Discharge: HOME | End: 2018-11-09
Attending: EMERGENCY MEDICINE | Admitting: EMERGENCY MEDICINE

## 2018-11-07 ENCOUNTER — APPOINTMENT (OUTPATIENT)
Dept: GENERAL RADIOLOGY | Facility: HOSPITAL | Age: 78
End: 2018-11-07

## 2018-11-07 ENCOUNTER — APPOINTMENT (OUTPATIENT)
Dept: CT IMAGING | Facility: HOSPITAL | Age: 78
End: 2018-11-07

## 2018-11-07 DIAGNOSIS — R11.2 NAUSEA AND VOMITING, INTRACTABILITY OF VOMITING NOT SPECIFIED, UNSPECIFIED VOMITING TYPE: ICD-10-CM

## 2018-11-07 DIAGNOSIS — R10.32 LEFT LOWER QUADRANT PAIN: Primary | ICD-10-CM

## 2018-11-07 DIAGNOSIS — K40.90 REDUCIBLE LEFT INGUINAL HERNIA: ICD-10-CM

## 2018-11-07 DIAGNOSIS — K40.90 LEFT INGUINAL HERNIA: ICD-10-CM

## 2018-11-07 DIAGNOSIS — K40.31 RECURRENT UNILATERAL INGUINAL HERNIA WITH OBSTRUCTION AND WITHOUT GANGRENE: ICD-10-CM

## 2018-11-07 PROBLEM — I25.10 CAD (CORONARY ARTERY DISEASE): Chronic | Status: ACTIVE | Noted: 2017-08-21

## 2018-11-07 PROBLEM — J96.11 CHRONIC RESPIRATORY FAILURE WITH HYPOXIA (HCC): Chronic | Status: ACTIVE | Noted: 2018-11-07

## 2018-11-07 PROBLEM — J43.9 EMPHYSEMA OF LUNG (HCC): Chronic | Status: ACTIVE | Noted: 2017-10-01

## 2018-11-07 PROBLEM — J43.9 EMPHYSEMA OF LUNG (HCC): Status: ACTIVE | Noted: 2017-10-01

## 2018-11-07 PROBLEM — E78.5 HYPERLIPIDEMIA: Chronic | Status: ACTIVE | Noted: 2017-08-21

## 2018-11-07 PROBLEM — I10 HYPERTENSION: Chronic | Status: ACTIVE | Noted: 2017-08-21

## 2018-11-07 LAB
ALBUMIN SERPL-MCNC: 4.3 G/DL (ref 3.4–4.8)
ALBUMIN/GLOB SERPL: 1.4 G/DL (ref 1.1–1.8)
ALP SERPL-CCNC: 99 U/L (ref 38–126)
ALT SERPL W P-5'-P-CCNC: 31 U/L (ref 21–72)
ANION GAP SERPL CALCULATED.3IONS-SCNC: 8 MMOL/L (ref 5–15)
APTT PPP: 21.3 SECONDS (ref 20–40.3)
AST SERPL-CCNC: 26 U/L (ref 17–59)
BACTERIA UR QL AUTO: ABNORMAL /HPF
BASOPHILS # BLD AUTO: 0.01 10*3/MM3 (ref 0–0.2)
BASOPHILS NFR BLD AUTO: 0.1 % (ref 0–2)
BILIRUB SERPL-MCNC: 0.8 MG/DL (ref 0.2–1.3)
BILIRUB UR QL STRIP: NEGATIVE
BUN BLD-MCNC: 46 MG/DL (ref 7–21)
BUN/CREAT SERPL: 37.4 (ref 7–25)
CA-I BLD-MCNC: 4.25 MG/DL (ref 4.6–5.6)
CALCIUM SPEC-SCNC: 10.7 MG/DL (ref 8.4–10.2)
CHLORIDE SERPL-SCNC: 95 MMOL/L (ref 95–110)
CLARITY UR: CLEAR
CO2 SERPL-SCNC: 30 MMOL/L (ref 22–31)
COLOR UR: YELLOW
CREAT BLD-MCNC: 1.23 MG/DL (ref 0.7–1.3)
CRP SERPL-MCNC: <0.5 MG/DL (ref 0–1)
D-LACTATE SERPL-SCNC: 2.6 MMOL/L (ref 0.5–2)
DEPRECATED RDW RBC AUTO: 47.3 FL (ref 35.1–43.9)
EOSINOPHIL # BLD AUTO: 0 10*3/MM3 (ref 0–0.7)
EOSINOPHIL NFR BLD AUTO: 0 % (ref 0–7)
ERYTHROCYTE [DISTWIDTH] IN BLOOD BY AUTOMATED COUNT: 13.8 % (ref 11.5–14.5)
GFR SERPL CREATININE-BSD FRML MDRD: 57 ML/MIN/1.73 (ref 42–98)
GLOBULIN UR ELPH-MCNC: 3.1 GM/DL (ref 2.3–3.5)
GLUCOSE BLD-MCNC: 216 MG/DL (ref 60–100)
GLUCOSE UR STRIP-MCNC: NEGATIVE MG/DL
HCT VFR BLD AUTO: 43 % (ref 39–49)
HGB BLD-MCNC: 15 G/DL (ref 13.7–17.3)
HGB UR QL STRIP.AUTO: NEGATIVE
HOLD SPECIMEN: NORMAL
HYALINE CASTS UR QL AUTO: ABNORMAL /LPF
IMM GRANULOCYTES # BLD: 0.09 10*3/MM3 (ref 0–0.02)
IMM GRANULOCYTES NFR BLD: 0.5 % (ref 0–0.5)
INR PPP: 1.09 (ref 0.8–1.2)
KETONES UR QL STRIP: NEGATIVE
LEUKOCYTE ESTERASE UR QL STRIP.AUTO: ABNORMAL
LYMPHOCYTES # BLD AUTO: 0.97 10*3/MM3 (ref 0.6–4.2)
LYMPHOCYTES NFR BLD AUTO: 5.4 % (ref 10–50)
MAGNESIUM SERPL-MCNC: 1.9 MG/DL (ref 1.6–2.3)
MCH RBC QN AUTO: 33 PG (ref 26.5–34)
MCHC RBC AUTO-ENTMCNC: 34.9 G/DL (ref 31.5–36.3)
MCV RBC AUTO: 94.7 FL (ref 80–98)
MONOCYTES # BLD AUTO: 0.51 10*3/MM3 (ref 0–0.9)
MONOCYTES NFR BLD AUTO: 2.9 % (ref 0–12)
NEUTROPHILS # BLD AUTO: 16.22 10*3/MM3 (ref 2–8.6)
NEUTROPHILS NFR BLD AUTO: 91.1 % (ref 37–80)
NITRITE UR QL STRIP: NEGATIVE
PH UR STRIP.AUTO: <=5 [PH] (ref 5–9)
PHOSPHATE SERPL-MCNC: 5.2 MG/DL (ref 2.4–4.4)
PLATELET # BLD AUTO: 132 10*3/MM3 (ref 150–450)
PMV BLD AUTO: 10.3 FL (ref 8–12)
POTASSIUM BLD-SCNC: 5 MMOL/L (ref 3.5–5.1)
PROT SERPL-MCNC: 7.4 G/DL (ref 6.3–8.6)
PROT UR QL STRIP: NEGATIVE
PROTHROMBIN TIME: 13.9 SECONDS (ref 11.1–15.3)
RBC # BLD AUTO: 4.54 10*6/MM3 (ref 4.37–5.74)
RBC # UR: ABNORMAL /HPF
RBC MORPH BLD: NORMAL
REF LAB TEST METHOD: ABNORMAL
SMALL PLATELETS BLD QL SMEAR: NORMAL
SODIUM BLD-SCNC: 133 MMOL/L (ref 137–145)
SP GR UR STRIP: 1.02 (ref 1–1.03)
SQUAMOUS #/AREA URNS HPF: ABNORMAL /HPF
UROBILINOGEN UR QL STRIP: ABNORMAL
WBC MORPH BLD: NORMAL
WBC NRBC COR # BLD: 17.8 10*3/MM3 (ref 3.2–9.8)
WBC UR QL AUTO: ABNORMAL /HPF
WHOLE BLOOD HOLD SPECIMEN: NORMAL
WHOLE BLOOD HOLD SPECIMEN: NORMAL

## 2018-11-07 PROCEDURE — 25010000002 PIPERACILLIN-TAZOBACTAM: Performed by: STUDENT IN AN ORGANIZED HEALTH CARE EDUCATION/TRAINING PROGRAM

## 2018-11-07 PROCEDURE — 81001 URINALYSIS AUTO W/SCOPE: CPT | Performed by: EMERGENCY MEDICINE

## 2018-11-07 PROCEDURE — 96375 TX/PRO/DX INJ NEW DRUG ADDON: CPT

## 2018-11-07 PROCEDURE — 84100 ASSAY OF PHOSPHORUS: CPT | Performed by: EMERGENCY MEDICINE

## 2018-11-07 PROCEDURE — 99204 OFFICE O/P NEW MOD 45 MIN: CPT | Performed by: SURGERY

## 2018-11-07 PROCEDURE — 93005 ELECTROCARDIOGRAM TRACING: CPT

## 2018-11-07 PROCEDURE — 83735 ASSAY OF MAGNESIUM: CPT | Performed by: EMERGENCY MEDICINE

## 2018-11-07 PROCEDURE — 85730 THROMBOPLASTIN TIME PARTIAL: CPT | Performed by: EMERGENCY MEDICINE

## 2018-11-07 PROCEDURE — 96361 HYDRATE IV INFUSION ADD-ON: CPT

## 2018-11-07 PROCEDURE — G0378 HOSPITAL OBSERVATION PER HR: HCPCS

## 2018-11-07 PROCEDURE — 85610 PROTHROMBIN TIME: CPT | Performed by: EMERGENCY MEDICINE

## 2018-11-07 PROCEDURE — 80053 COMPREHEN METABOLIC PANEL: CPT | Performed by: EMERGENCY MEDICINE

## 2018-11-07 PROCEDURE — 25010000002 MORPHINE PER 10 MG: Performed by: STUDENT IN AN ORGANIZED HEALTH CARE EDUCATION/TRAINING PROGRAM

## 2018-11-07 PROCEDURE — 96365 THER/PROPH/DIAG IV INF INIT: CPT

## 2018-11-07 PROCEDURE — 86140 C-REACTIVE PROTEIN: CPT | Performed by: EMERGENCY MEDICINE

## 2018-11-07 PROCEDURE — 93005 ELECTROCARDIOGRAM TRACING: CPT | Performed by: EMERGENCY MEDICINE

## 2018-11-07 PROCEDURE — 82330 ASSAY OF CALCIUM: CPT | Performed by: EMERGENCY MEDICINE

## 2018-11-07 PROCEDURE — 71045 X-RAY EXAM CHEST 1 VIEW: CPT

## 2018-11-07 PROCEDURE — 99285 EMERGENCY DEPT VISIT HI MDM: CPT

## 2018-11-07 PROCEDURE — 83605 ASSAY OF LACTIC ACID: CPT | Performed by: EMERGENCY MEDICINE

## 2018-11-07 PROCEDURE — 85007 BL SMEAR W/DIFF WBC COUNT: CPT | Performed by: EMERGENCY MEDICINE

## 2018-11-07 PROCEDURE — 87040 BLOOD CULTURE FOR BACTERIA: CPT

## 2018-11-07 PROCEDURE — 85025 COMPLETE CBC W/AUTO DIFF WBC: CPT | Performed by: EMERGENCY MEDICINE

## 2018-11-07 PROCEDURE — 74176 CT ABD & PELVIS W/O CONTRAST: CPT

## 2018-11-07 PROCEDURE — 93010 ELECTROCARDIOGRAM REPORT: CPT | Performed by: INTERNAL MEDICINE

## 2018-11-07 RX ORDER — SODIUM CHLORIDE 0.9 % (FLUSH) 0.9 %
10 SYRINGE (ML) INJECTION AS NEEDED
Status: DISCONTINUED | OUTPATIENT
Start: 2018-11-07 | End: 2018-11-09 | Stop reason: HOSPADM

## 2018-11-07 RX ORDER — LISINOPRIL 10 MG/1
10 TABLET ORAL
Status: DISCONTINUED | OUTPATIENT
Start: 2018-11-08 | End: 2018-11-09 | Stop reason: HOSPADM

## 2018-11-07 RX ORDER — ALLOPURINOL 100 MG/1
100 TABLET ORAL DAILY
COMMUNITY

## 2018-11-07 RX ORDER — CARVEDILOL 3.12 MG/1
3.12 TABLET ORAL 2 TIMES DAILY WITH MEALS
Status: DISCONTINUED | OUTPATIENT
Start: 2018-11-08 | End: 2018-11-09 | Stop reason: HOSPADM

## 2018-11-07 RX ORDER — PREDNISONE 10 MG/1
10 TABLET ORAL DAILY
COMMUNITY

## 2018-11-07 RX ORDER — ALLOPURINOL 100 MG/1
100 TABLET ORAL DAILY
Status: DISCONTINUED | OUTPATIENT
Start: 2018-11-08 | End: 2018-11-09 | Stop reason: HOSPADM

## 2018-11-07 RX ORDER — SODIUM CHLORIDE 0.9 % (FLUSH) 0.9 %
3-10 SYRINGE (ML) INJECTION AS NEEDED
Status: DISCONTINUED | OUTPATIENT
Start: 2018-11-07 | End: 2018-11-09 | Stop reason: HOSPADM

## 2018-11-07 RX ORDER — PREDNISONE 10 MG/1
10 TABLET ORAL DAILY
Status: DISCONTINUED | OUTPATIENT
Start: 2018-11-08 | End: 2018-11-09 | Stop reason: HOSPADM

## 2018-11-07 RX ORDER — TAMSULOSIN HYDROCHLORIDE 0.4 MG/1
0.4 CAPSULE ORAL DAILY
Status: DISCONTINUED | OUTPATIENT
Start: 2018-11-08 | End: 2018-11-09 | Stop reason: HOSPADM

## 2018-11-07 RX ORDER — SODIUM CHLORIDE 9 MG/ML
50 INJECTION, SOLUTION INTRAVENOUS CONTINUOUS
Status: DISCONTINUED | OUTPATIENT
Start: 2018-11-08 | End: 2018-11-09 | Stop reason: HOSPADM

## 2018-11-07 RX ORDER — AMLODIPINE BESYLATE 10 MG/1
10 TABLET ORAL
Status: DISCONTINUED | OUTPATIENT
Start: 2018-11-08 | End: 2018-11-07

## 2018-11-07 RX ORDER — ASPIRIN 81 MG/1
81 TABLET ORAL DAILY
Status: DISCONTINUED | OUTPATIENT
Start: 2018-11-08 | End: 2018-11-09 | Stop reason: HOSPADM

## 2018-11-07 RX ORDER — ALBUTEROL SULFATE 2.5 MG/3ML
2.5 SOLUTION RESPIRATORY (INHALATION)
Status: DISCONTINUED | OUTPATIENT
Start: 2018-11-08 | End: 2018-11-09 | Stop reason: HOSPADM

## 2018-11-07 RX ORDER — NALOXONE HCL 0.4 MG/ML
0.4 VIAL (ML) INJECTION
Status: DISCONTINUED | OUTPATIENT
Start: 2018-11-07 | End: 2018-11-09 | Stop reason: HOSPADM

## 2018-11-07 RX ORDER — MORPHINE SULFATE 2 MG/ML
2 INJECTION, SOLUTION INTRAMUSCULAR; INTRAVENOUS ONCE
Status: DISCONTINUED | OUTPATIENT
Start: 2018-11-08 | End: 2018-11-09 | Stop reason: HOSPADM

## 2018-11-07 RX ORDER — PANTOPRAZOLE SODIUM 40 MG/1
40 TABLET, DELAYED RELEASE ORAL EVERY MORNING
Status: DISCONTINUED | OUTPATIENT
Start: 2018-11-08 | End: 2018-11-09 | Stop reason: HOSPADM

## 2018-11-07 RX ORDER — ATORVASTATIN CALCIUM 20 MG/1
20 TABLET, FILM COATED ORAL DAILY
Status: DISCONTINUED | OUTPATIENT
Start: 2018-11-08 | End: 2018-11-09 | Stop reason: HOSPADM

## 2018-11-07 RX ORDER — AZITHROMYCIN 250 MG/1
250 TABLET, FILM COATED ORAL 3 TIMES WEEKLY
COMMUNITY

## 2018-11-07 RX ORDER — SODIUM CHLORIDE 9 MG/ML
100 INJECTION, SOLUTION INTRAVENOUS CONTINUOUS
Status: DISCONTINUED | OUTPATIENT
Start: 2018-11-07 | End: 2018-11-07

## 2018-11-07 RX ORDER — BUDESONIDE AND FORMOTEROL FUMARATE DIHYDRATE 160; 4.5 UG/1; UG/1
2 AEROSOL RESPIRATORY (INHALATION)
Status: DISCONTINUED | OUTPATIENT
Start: 2018-11-08 | End: 2018-11-09 | Stop reason: HOSPADM

## 2018-11-07 RX ORDER — SODIUM CHLORIDE 0.9 % (FLUSH) 0.9 %
3 SYRINGE (ML) INJECTION EVERY 12 HOURS SCHEDULED
Status: DISCONTINUED | OUTPATIENT
Start: 2018-11-08 | End: 2018-11-09 | Stop reason: HOSPADM

## 2018-11-07 RX ORDER — AZITHROMYCIN 250 MG/1
250 TABLET, FILM COATED ORAL DAILY
Status: ON HOLD | COMMUNITY
End: 2018-11-07

## 2018-11-07 RX ADMIN — SODIUM CHLORIDE 1000 ML: 9 INJECTION, SOLUTION INTRAVENOUS at 20:12

## 2018-11-07 RX ADMIN — MORPHINE SULFATE 3 MG: 4 INJECTION INTRAVENOUS at 20:14

## 2018-11-07 RX ADMIN — PIPERACILLIN SODIUM,TAZOBACTAM SODIUM 3.38 G: 3; .375 INJECTION, POWDER, FOR SOLUTION INTRAVENOUS at 22:11

## 2018-11-07 RX ADMIN — Medication 10 ML: at 22:11

## 2018-11-07 RX ADMIN — Medication 10 ML: at 19:50

## 2018-11-07 RX ADMIN — SODIUM CHLORIDE 100 ML/HR: 900 INJECTION, SOLUTION INTRAVENOUS at 21:28

## 2018-11-07 RX ADMIN — Medication 10 ML: at 19:43

## 2018-11-08 ENCOUNTER — ANESTHESIA EVENT (OUTPATIENT)
Dept: PERIOP | Facility: HOSPITAL | Age: 78
End: 2018-11-08

## 2018-11-08 ENCOUNTER — ANESTHESIA (OUTPATIENT)
Dept: PERIOP | Facility: HOSPITAL | Age: 78
End: 2018-11-08

## 2018-11-08 PROBLEM — K40.90 LEFT INGUINAL HERNIA: Status: ACTIVE | Noted: 2018-11-07

## 2018-11-08 LAB
ANION GAP SERPL CALCULATED.3IONS-SCNC: 6 MMOL/L (ref 5–15)
BASOPHILS # BLD AUTO: 0.02 10*3/MM3 (ref 0–0.2)
BASOPHILS NFR BLD AUTO: 0.1 % (ref 0–2)
BUN BLD-MCNC: 48 MG/DL (ref 7–21)
BUN/CREAT SERPL: 42.9 (ref 7–25)
CALCIUM SPEC-SCNC: 8.7 MG/DL (ref 8.4–10.2)
CHLORIDE SERPL-SCNC: 102 MMOL/L (ref 95–110)
CO2 SERPL-SCNC: 27 MMOL/L (ref 22–31)
CREAT BLD-MCNC: 1.12 MG/DL (ref 0.7–1.3)
D-LACTATE SERPL-SCNC: 2.3 MMOL/L (ref 0.5–2)
DEPRECATED RDW RBC AUTO: 47.9 FL (ref 35.1–43.9)
EOSINOPHIL # BLD AUTO: 0.05 10*3/MM3 (ref 0–0.7)
EOSINOPHIL NFR BLD AUTO: 0.3 % (ref 0–7)
ERYTHROCYTE [DISTWIDTH] IN BLOOD BY AUTOMATED COUNT: 13.9 % (ref 11.5–14.5)
GFR SERPL CREATININE-BSD FRML MDRD: 63 ML/MIN/1.73 (ref 42–98)
GLUCOSE BLD-MCNC: 86 MG/DL (ref 60–100)
HCT VFR BLD AUTO: 34.8 % (ref 39–49)
HGB BLD-MCNC: 11.9 G/DL (ref 13.7–17.3)
IMM GRANULOCYTES # BLD: 0.08 10*3/MM3 (ref 0–0.02)
IMM GRANULOCYTES NFR BLD: 0.5 % (ref 0–0.5)
LYMPHOCYTES # BLD AUTO: 1.64 10*3/MM3 (ref 0.6–4.2)
LYMPHOCYTES NFR BLD AUTO: 9.7 % (ref 10–50)
MCH RBC QN AUTO: 32.6 PG (ref 26.5–34)
MCHC RBC AUTO-ENTMCNC: 34.2 G/DL (ref 31.5–36.3)
MCV RBC AUTO: 95.3 FL (ref 80–98)
MONOCYTES # BLD AUTO: 1.13 10*3/MM3 (ref 0–0.9)
MONOCYTES NFR BLD AUTO: 6.7 % (ref 0–12)
NEUTROPHILS # BLD AUTO: 14 10*3/MM3 (ref 2–8.6)
NEUTROPHILS NFR BLD AUTO: 82.7 % (ref 37–80)
PLATELET # BLD AUTO: 105 10*3/MM3 (ref 150–450)
PMV BLD AUTO: 10.3 FL (ref 8–12)
POTASSIUM BLD-SCNC: 4.2 MMOL/L (ref 3.5–5.1)
RBC # BLD AUTO: 3.65 10*6/MM3 (ref 4.37–5.74)
SODIUM BLD-SCNC: 135 MMOL/L (ref 137–145)
WBC NRBC COR # BLD: 16.92 10*3/MM3 (ref 3.2–9.8)

## 2018-11-08 PROCEDURE — 94760 N-INVAS EAR/PLS OXIMETRY 1: CPT

## 2018-11-08 PROCEDURE — 94640 AIRWAY INHALATION TREATMENT: CPT

## 2018-11-08 PROCEDURE — 25010000002 HYDROMORPHONE 1 MG/ML SOLUTION: Performed by: ANESTHESIOLOGY

## 2018-11-08 PROCEDURE — 25010000003 CEFAZOLIN PER 500 MG: Performed by: NURSE ANESTHETIST, CERTIFIED REGISTERED

## 2018-11-08 PROCEDURE — 25010000002 FENTANYL CITRATE (PF) 100 MCG/2ML SOLUTION: Performed by: NURSE ANESTHETIST, CERTIFIED REGISTERED

## 2018-11-08 PROCEDURE — 99024 POSTOP FOLLOW-UP VISIT: CPT | Performed by: SURGERY

## 2018-11-08 PROCEDURE — 25010000002 PHENYLEPHRINE PER 1 ML: Performed by: NURSE ANESTHETIST, CERTIFIED REGISTERED

## 2018-11-08 PROCEDURE — 94799 UNLISTED PULMONARY SVC/PX: CPT

## 2018-11-08 PROCEDURE — 96367 TX/PROPH/DG ADDL SEQ IV INF: CPT

## 2018-11-08 PROCEDURE — C1781 MESH (IMPLANTABLE): HCPCS | Performed by: SURGERY

## 2018-11-08 PROCEDURE — G0378 HOSPITAL OBSERVATION PER HR: HCPCS

## 2018-11-08 PROCEDURE — 85025 COMPLETE CBC W/AUTO DIFF WBC: CPT | Performed by: INTERNAL MEDICINE

## 2018-11-08 PROCEDURE — 80048 BASIC METABOLIC PNL TOTAL CA: CPT | Performed by: INTERNAL MEDICINE

## 2018-11-08 PROCEDURE — 49505 PRP I/HERN INIT REDUC >5 YR: CPT | Performed by: SURGERY

## 2018-11-08 PROCEDURE — 25010000002 PROPOFOL 10 MG/ML EMULSION: Performed by: NURSE ANESTHETIST, CERTIFIED REGISTERED

## 2018-11-08 PROCEDURE — 25010000002 CEFAZOLIN PER 500 MG: Performed by: SURGERY

## 2018-11-08 PROCEDURE — 25010000002 CEFTRIAXONE PER 250 MG: Performed by: INTERNAL MEDICINE

## 2018-11-08 DEVICE — MESH FLUT SHT 3X6IN: Type: IMPLANTABLE DEVICE | Site: INGUINAL | Status: FUNCTIONAL

## 2018-11-08 RX ORDER — ONDANSETRON 2 MG/ML
4 INJECTION INTRAMUSCULAR; INTRAVENOUS ONCE AS NEEDED
Status: DISCONTINUED | OUTPATIENT
Start: 2018-11-08 | End: 2018-11-08 | Stop reason: HOSPADM

## 2018-11-08 RX ORDER — ONDANSETRON 4 MG/1
4 TABLET, ORALLY DISINTEGRATING ORAL EVERY 6 HOURS PRN
Status: DISCONTINUED | OUTPATIENT
Start: 2018-11-08 | End: 2018-11-09 | Stop reason: HOSPADM

## 2018-11-08 RX ORDER — EPHEDRINE SULFATE 50 MG/ML
INJECTION, SOLUTION INTRAVENOUS AS NEEDED
Status: DISCONTINUED | OUTPATIENT
Start: 2018-11-08 | End: 2018-11-08 | Stop reason: SURG

## 2018-11-08 RX ORDER — NALOXONE HCL 0.4 MG/ML
0.1 VIAL (ML) INJECTION
Status: DISCONTINUED | OUTPATIENT
Start: 2018-11-08 | End: 2018-11-09 | Stop reason: HOSPADM

## 2018-11-08 RX ORDER — LIDOCAINE HYDROCHLORIDE 20 MG/ML
INJECTION, SOLUTION INFILTRATION; PERINEURAL AS NEEDED
Status: DISCONTINUED | OUTPATIENT
Start: 2018-11-08 | End: 2018-11-08

## 2018-11-08 RX ORDER — FENTANYL CITRATE 50 UG/ML
INJECTION, SOLUTION INTRAMUSCULAR; INTRAVENOUS AS NEEDED
Status: DISCONTINUED | OUTPATIENT
Start: 2018-11-08 | End: 2018-11-08 | Stop reason: SURG

## 2018-11-08 RX ORDER — HYDROMORPHONE HCL 110MG/55ML
0.5 PATIENT CONTROLLED ANALGESIA SYRINGE INTRAVENOUS
Status: DISCONTINUED | OUTPATIENT
Start: 2018-11-08 | End: 2018-11-09 | Stop reason: HOSPADM

## 2018-11-08 RX ORDER — SODIUM CHLORIDE, SODIUM GLUCONATE, SODIUM ACETATE, POTASSIUM CHLORIDE, AND MAGNESIUM CHLORIDE 526; 502; 368; 37; 30 MG/100ML; MG/100ML; MG/100ML; MG/100ML; MG/100ML
INJECTION, SOLUTION INTRAVENOUS CONTINUOUS PRN
Status: DISCONTINUED | OUTPATIENT
Start: 2018-11-08 | End: 2018-11-08 | Stop reason: SURG

## 2018-11-08 RX ORDER — ALBUTEROL SULFATE 2.5 MG/3ML
2.5 SOLUTION RESPIRATORY (INHALATION) ONCE
Status: COMPLETED | OUTPATIENT
Start: 2018-11-08 | End: 2018-11-08

## 2018-11-08 RX ORDER — BUPIVACAINE HYDROCHLORIDE AND EPINEPHRINE 5; 5 MG/ML; UG/ML
INJECTION, SOLUTION EPIDURAL; INTRACAUDAL; PERINEURAL AS NEEDED
Status: DISCONTINUED | OUTPATIENT
Start: 2018-11-08 | End: 2018-11-09 | Stop reason: HOSPADM

## 2018-11-08 RX ORDER — MEPERIDINE HYDROCHLORIDE 50 MG/ML
12.5 INJECTION INTRAMUSCULAR; INTRAVENOUS; SUBCUTANEOUS
Status: DISCONTINUED | OUTPATIENT
Start: 2018-11-08 | End: 2018-11-08 | Stop reason: HOSPADM

## 2018-11-08 RX ORDER — DOCUSATE SODIUM 100 MG/1
100 CAPSULE, LIQUID FILLED ORAL 2 TIMES DAILY PRN
Status: DISCONTINUED | OUTPATIENT
Start: 2018-11-08 | End: 2018-11-09 | Stop reason: HOSPADM

## 2018-11-08 RX ORDER — LIDOCAINE HYDROCHLORIDE 20 MG/ML
INJECTION, SOLUTION INFILTRATION; PERINEURAL AS NEEDED
Status: DISCONTINUED | OUTPATIENT
Start: 2018-11-08 | End: 2018-11-08 | Stop reason: SURG

## 2018-11-08 RX ORDER — BUPIVACAINE HCL/0.9 % NACL/PF 0.1 %
2 PLASTIC BAG, INJECTION (ML) EPIDURAL EVERY 8 HOURS
Status: COMPLETED | OUTPATIENT
Start: 2018-11-08 | End: 2018-11-09

## 2018-11-08 RX ORDER — PROPOFOL 10 MG/ML
VIAL (ML) INTRAVENOUS AS NEEDED
Status: DISCONTINUED | OUTPATIENT
Start: 2018-11-08 | End: 2018-11-08 | Stop reason: SURG

## 2018-11-08 RX ORDER — ALBUTEROL SULFATE 2.5 MG/3ML
2.5 SOLUTION RESPIRATORY (INHALATION) ONCE AS NEEDED
Status: DISCONTINUED | OUTPATIENT
Start: 2018-11-08 | End: 2018-11-08 | Stop reason: HOSPADM

## 2018-11-08 RX ORDER — SODIUM CHLORIDE 9 MG/ML
125 INJECTION, SOLUTION INTRAVENOUS CONTINUOUS
Status: DISCONTINUED | OUTPATIENT
Start: 2018-11-08 | End: 2018-11-09 | Stop reason: HOSPADM

## 2018-11-08 RX ORDER — CEFAZOLIN SODIUM 1 G/3ML
INJECTION, POWDER, FOR SOLUTION INTRAMUSCULAR; INTRAVENOUS AS NEEDED
Status: DISCONTINUED | OUTPATIENT
Start: 2018-11-08 | End: 2018-11-08 | Stop reason: SURG

## 2018-11-08 RX ORDER — ONDANSETRON 2 MG/ML
4 INJECTION INTRAMUSCULAR; INTRAVENOUS EVERY 6 HOURS PRN
Status: DISCONTINUED | OUTPATIENT
Start: 2018-11-08 | End: 2018-11-09 | Stop reason: HOSPADM

## 2018-11-08 RX ORDER — HYDROCODONE BITARTRATE AND ACETAMINOPHEN 5; 325 MG/1; MG/1
1 TABLET ORAL EVERY 4 HOURS PRN
Status: DISCONTINUED | OUTPATIENT
Start: 2018-11-08 | End: 2018-11-09 | Stop reason: HOSPADM

## 2018-11-08 RX ORDER — ONDANSETRON 4 MG/1
4 TABLET, FILM COATED ORAL EVERY 6 HOURS PRN
Status: DISCONTINUED | OUTPATIENT
Start: 2018-11-08 | End: 2018-11-09 | Stop reason: HOSPADM

## 2018-11-08 RX ADMIN — ALBUTEROL SULFATE 2.5 MG: 2.5 SOLUTION RESPIRATORY (INHALATION) at 14:54

## 2018-11-08 RX ADMIN — HYDROMORPHONE HYDROCHLORIDE 0.5 MG: 1 INJECTION, SOLUTION INTRAMUSCULAR; INTRAVENOUS; SUBCUTANEOUS at 17:01

## 2018-11-08 RX ADMIN — SODIUM CHLORIDE, SODIUM GLUCONATE, SODIUM ACETATE, POTASSIUM CHLORIDE, AND MAGNESIUM CHLORIDE: 526; 502; 368; 37; 30 INJECTION, SOLUTION INTRAVENOUS at 16:14

## 2018-11-08 RX ADMIN — SODIUM CHLORIDE 2 G: 9 INJECTION, SOLUTION INTRAVENOUS at 20:36

## 2018-11-08 RX ADMIN — BUDESONIDE AND FORMOTEROL FUMARATE DIHYDRATE 2 PUFF: 160; 4.5 AEROSOL RESPIRATORY (INHALATION) at 08:11

## 2018-11-08 RX ADMIN — PHENYLEPHRINE HYDROCHLORIDE 50 MCG: 10 INJECTION INTRAVENOUS at 15:38

## 2018-11-08 RX ADMIN — PHENYLEPHRINE HYDROCHLORIDE 50 MCG: 10 INJECTION INTRAVENOUS at 15:41

## 2018-11-08 RX ADMIN — CEFTRIAXONE SODIUM 1 G: 1 INJECTION, POWDER, FOR SOLUTION INTRAMUSCULAR; INTRAVENOUS at 06:19

## 2018-11-08 RX ADMIN — LIDOCAINE HYDROCHLORIDE 50 MG: 20 INJECTION, SOLUTION INFILTRATION; PERINEURAL at 15:29

## 2018-11-08 RX ADMIN — ALBUTEROL SULFATE 2.5 MG: 2.5 SOLUTION RESPIRATORY (INHALATION) at 11:07

## 2018-11-08 RX ADMIN — PHENYLEPHRINE HYDROCHLORIDE 75 MCG: 10 INJECTION INTRAVENOUS at 15:43

## 2018-11-08 RX ADMIN — FENTANYL CITRATE 25 MCG: 50 INJECTION, SOLUTION INTRAMUSCULAR; INTRAVENOUS at 15:44

## 2018-11-08 RX ADMIN — PANTOPRAZOLE SODIUM 40 MG: 40 TABLET, DELAYED RELEASE ORAL at 06:19

## 2018-11-08 RX ADMIN — PROPOFOL 80 MG: 10 INJECTION, EMULSION INTRAVENOUS at 15:29

## 2018-11-08 RX ADMIN — FENTANYL CITRATE 50 MCG: 50 INJECTION, SOLUTION INTRAMUSCULAR; INTRAVENOUS at 15:29

## 2018-11-08 RX ADMIN — CEFAZOLIN SODIUM 2 G: 1 INJECTION, POWDER, FOR SOLUTION INTRAMUSCULAR; INTRAVENOUS at 15:36

## 2018-11-08 RX ADMIN — EPHEDRINE SULFATE 5 MG: 50 INJECTION INTRAVENOUS at 16:23

## 2018-11-08 RX ADMIN — EPHEDRINE SULFATE 10 MG: 50 INJECTION INTRAVENOUS at 15:46

## 2018-11-08 RX ADMIN — ALBUTEROL SULFATE 2.5 MG: 2.5 SOLUTION RESPIRATORY (INHALATION) at 08:08

## 2018-11-08 RX ADMIN — SODIUM CHLORIDE 125 ML/HR: 9 INJECTION, SOLUTION INTRAVENOUS at 18:05

## 2018-11-08 RX ADMIN — EPHEDRINE SULFATE 5 MG: 50 INJECTION INTRAVENOUS at 16:22

## 2018-11-08 RX ADMIN — SODIUM CHLORIDE: 900 INJECTION, SOLUTION INTRAVENOUS at 15:17

## 2018-11-08 NOTE — ED NOTES
Hernia noted to groin area and states that he was unable to push it back in this time.      Lori James, RN  11/07/18 1958

## 2018-11-08 NOTE — CONSULTS
"Adult Nutrition  Assessment    Patient Name:  Montana Gonzalez  YOB: 1940  MRN: [id number]  Admit Date:  11/7/2018    Assessment Date:  11/8/2018    Comments:  RD consult r/t MST 7. Pt admitted r/t inguinal hernia and will likely undergo surgical repair today. Currently NPO for procedure. Per family member pt has lost ~40# in the past 1 year d/t \"food doesn't taste right to him anymore\". Pt does take Boost at home. RD will monitor hospital course/diet initiation and make recs accordingly.           Reason for Assessment     Row Name 11/08/18 1045          Reason for Assessment    Reason For Assessment identified at risk by screening criteria     Diagnosis gastrointestinal disease     Identified At Risk by Screening Criteria MST SCORE 2+;other (see comments)   ~40# wt loss in past 1 year               Nutrition/Diet History     Row Name 11/08/18 1046          Nutrition/Diet History    Typical Food/Fluid Intake Pt sleeping, spoke w/family who reports pt has lost ~40# in the past year d/t \"food just doesn't taste right to him anymore\". Pt takes Boost at home. Other food prefs voiced.      Factors Affecting Nutritional Intake taste altered               Labs/Tests/Procedures/Meds     Row Name 11/08/18 1047          Labs/Procedures/Meds    Lab Results Reviewed reviewed, pertinent     Lab Results Comments na 135, Bun 48        Diagnostic Tests/Procedures    Diagnostic Test/Procedure Reviewed reviewed        Medications    Pertinent Medications Reviewed reviewed               Estimated/Assessed Needs     Row Name 11/08/18 1047          Calculation Measurements    Weight Used For Calculations 68 kg (150 lb)        Estimated/Assessed Needs    Additional Documentation Protein Requirements (Group);Fluid Requirements (Group);Calorie Requirements (Group)        Calorie Requirements    Estimated Calorie Requirement (kcal/day) 1700        KCAL/KG    14 Kcal/Kg (kcal) 952.56     15 Kcal/Kg (kcal) 1020.6     18 " Kcal/Kg (kcal) 1224.72     20 Kcal/Kg (kcal) 1360.8     25 Kcal/Kg (kcal) 1701     30 Kcal/Kg (kcal) 2041.2     35 Kcal/Kg (kcal) 2381.4     40 Kcal/Kg (kcal) 2721.6     45 Kcal/Kg (kcal) 3061.8     50 Kcal/Kg (kcal) 3402        [place]-St. Jeor Equation    RMR ([place]-St. Jeor Equation) 1369.9        Protein Requirements    Est Protein Requirement Amount (gms/kg) 0.8 gm protein     Estimated Protein Requirements (gms/day) 54.43        Fluid Requirements    Estimated Fluid Requirements (mL/day) 1700     RDA Method (mL) 1700     Og-Kaylene Method (over 20 kg) 2860.8             Nutrition Prescription Ordered     Row Name 11/08/18 1048          Nutrition Prescription PO    Current PO Diet NPO             Evaluation of Received Nutrient/Fluid Intake     Row Name 11/08/18 1047          Calculation Measurements    Weight Used For Calculations 68 kg (150 lb)             Evaluation of Prescribed Nutrient/Fluid Intake     Row Name 11/08/18 1047          Calculation Measurements    Weight Used For Calculations 68 kg (150 lb)           Electronically signed by:  Carla Gutiérrez RD  11/08/18 10:50 AM

## 2018-11-08 NOTE — PLAN OF CARE
Problem: Patient Care Overview  Goal: Plan of Care Review  Outcome: Ongoing (interventions implemented as appropriate)    Goal: Individualization and Mutuality  Outcome: Ongoing (interventions implemented as appropriate)    Goal: Discharge Needs Assessment  Outcome: Ongoing (interventions implemented as appropriate)    Goal: Interprofessional Rounds/Family Conf  Outcome: Ongoing (interventions implemented as appropriate)      Problem: Surgery Nonspecified (Adult)  Goal: Signs and Symptoms of Listed Potential Problems Will be Absent, Minimized or Managed (Surgery Nonspecified)  Outcome: Ongoing (interventions implemented as appropriate)    Goal: Anesthesia/Sedation Recovery  Outcome: Ongoing (interventions implemented as appropriate)      Problem: Hospitalized Acutely Ill Older (Adult)  Goal: Signs and Symptoms of Listed Potential Problems Will be Absent, Minimized or Managed (Hospitalized Acutely Ill Older)  Outcome: Ongoing (interventions implemented as appropriate)      Problem: Fall Risk (Adult)  Goal: Identify Related Risk Factors and Signs and Symptoms  Outcome: Ongoing (interventions implemented as appropriate)    Goal: Absence of Fall  Outcome: Ongoing (interventions implemented as appropriate)

## 2018-11-08 NOTE — PLAN OF CARE
Problem: Patient Care Overview  Goal: Plan of Care Review  Outcome: Ongoing (interventions implemented as appropriate)   11/08/18 0422   Coping/Psychosocial   Plan of Care Reviewed With patient   Plan of Care Review   Progress no change   OTHER   Outcome Summary New admit, VSS, no c/o pain, resting throughout the night, continue to monitor     Goal: Individualization and Mutuality  Outcome: Ongoing (interventions implemented as appropriate)      Problem: Surgery Nonspecified (Adult)  Goal: Signs and Symptoms of Listed Potential Problems Will be Absent, Minimized or Managed (Surgery Nonspecified)  Outcome: Ongoing (interventions implemented as appropriate)    Goal: Anesthesia/Sedation Recovery  Outcome: Ongoing (interventions implemented as appropriate)      Problem: Hospitalized Acutely Ill Older (Adult)  Goal: Signs and Symptoms of Listed Potential Problems Will be Absent, Minimized or Managed (Hospitalized Acutely Ill Older)  Outcome: Ongoing (interventions implemented as appropriate)

## 2018-11-08 NOTE — ANESTHESIA PREPROCEDURE EVALUATION
" Anesthesia Evaluation     Patient summary reviewed and Nursing notes reviewed   no history of anesthetic complications:  NPO Solid Status: > 8 hours  NPO Liquid Status: > 6 hours           Airway   Mallampati: II  TM distance: >3 FB  Neck ROM: full  possible difficult intubation  Dental    (+) poor dentation    Comment: Remaining three upper \"snags\". Hopeless dentition. Advised potential of damage and understands and agrees.    Pulmonary - normal exam   (+) pneumonia stable , a smoker (\"uses dip\".) Former, COPD moderate, shortness of breath (On nasal canula oxygen; In no acute distress.  ), decreased breath sounds, wheezes,     ROS comment:      FINDINGS:             - lines/tubes:    none     - cardiac:         size within normal limits         - mediastinum: contour within normal limits         - lungs:         Focal interstitial/airspace changes in the left  base, possibly representing a small focus of pneumonia.               - pleura:         no evidence of  fluid                  - osseous:         unremarkable for age                  - misc.:       IMPRESSION:  CONCLUSION:        1. Focal interstitial/airspace changes in the left base may  represent a small focus of pneumonia.                                                              Electronically signed by:  EVAN Castillo MD  11/7/2018 8:40  PE comment: Albuterol treatment ordered pre-op.  Cardiovascular - normal exam    ECG reviewed  Patient on routine beta blocker and Beta blocker given within 24 hours of surgery  Rhythm: regular  Rate: normal    (+) hypertension 2 medications or greater, CAD, cardiac stents more than 12 months ago PVD (History of aortic aneurysm.), hyperlipidemia,   (-) murmur, carotid bruits    ROS comment: ECG 12 Lead  Date/Time: 8/17/2017 5:38 PM  Performed by: JAMES SIERRA  Authorized by: JAMES SIERRA   Previous ECG: no previous ECG available  Rhythm: sinus rhythm  Rate: normal  BPM: 67  Conduction: conduction normal  ST " Segments: ST segments normal  T Waves: T waves normal  QRS axis: normal  Other: no other findings  Clinical impression: normal ECG    Neuro/Psych- negative ROS  GI/Hepatic/Renal/Endo - negative ROS     ROS Comment: HGB 11.9 HCT 34.8 11/8/18    Musculoskeletal     Abdominal    Substance History - negative use     OB/GYN negative ob/gyn ROS         Other   (+) arthritis                     Anesthesia Plan    ASA 4     general     intravenous induction   Anesthetic plan, all risks, benefits, and alternatives have been provided, discussed and informed consent has been obtained with: patient, spouse/significant other and child.

## 2018-11-08 NOTE — PROGRESS NOTES
Risks and benefits of open left inguinal hernia repair with mesh have been discussed.  Will plan to proceed.          This document has been electronically signed by Dallas Piña MD on November 8, 2018 2:52 PM

## 2018-11-08 NOTE — PROGRESS NOTES
GENERAL SURGERY PROGRESS NOTE  Chief Complaint:  Surgery Follow up   LOS: 0 days       Subjective     Interval History:     Feels well this AM. Had a BM, breathing comfortably.    Objective     Vital Signs  Temp:  [96.7 °F (35.9 °C)-98.1 °F (36.7 °C)] 98.1 °F (36.7 °C)  Heart Rate:  [] 70  Resp:  [16-22] 16  BP: ()/(37-65) 97/53    Physical Exam:   Left inguinal hernia, abdomen soft  Labs:  Lab Results (last 24 hours)     Procedure Component Value Units Date/Time    Blood Culture - Blood, [[id number]]  (Normal) Collected:  11/07/18 1946    Specimen:  Blood from Arm, Right Updated:  11/08/18 0800     Blood Culture No growth at less than 24 hours    Blood Culture - Blood, [[id number]]  (Normal) Collected:  11/07/18 2001    Specimen:  Blood from Arm, Right Updated:  11/08/18 0800     Blood Culture No growth at less than 24 hours    CBC Auto Differential [[id number]]  (Abnormal) Collected:  11/08/18 0645    Specimen:  Blood Updated:  11/08/18 0707     WBC 16.92 (H) 10*3/mm3      RBC 3.65 (L) 10*6/mm3      Hemoglobin 11.9 (L) g/dL      Comment: SPECIMEN RECOLLECTED/REANALYZED TO CONFIRM HGB RESULTS        Hematocrit 34.8 (L) %      MCV 95.3 fL      MCH 32.6 pg      MCHC 34.2 g/dL      RDW 13.9 %      RDW-SD 47.9 (H) fl      MPV 10.3 fL      Platelets 105 (L) 10*3/mm3      Neutrophil % 82.7 (H) %      Lymphocyte % 9.7 (L) %      Monocyte % 6.7 %      Eosinophil % 0.3 %      Basophil % 0.1 %      Immature Grans % 0.5 %      Neutrophils, Absolute 14.00 (H) 10*3/mm3      Lymphocytes, Absolute 1.64 10*3/mm3      Monocytes, Absolute 1.13 (H) 10*3/mm3      Eosinophils, Absolute 0.05 10*3/mm3      Basophils, Absolute 0.02 10*3/mm3      Immature Grans, Absolute 0.08 (H) 10*3/mm3     Basic Metabolic Panel [[id number]]  (Abnormal) Collected:  11/08/18 0557    Specimen:  Blood Updated:  11/08/18 0637     Glucose 86 mg/dL      BUN 48 (H) mg/dL      Creatinine 1.12 mg/dL      Sodium 135 (L) mmol/L      Potassium 4.2 mmol/L       Chloride 102 mmol/L      CO2 27.0 mmol/L      Calcium 8.7 mg/dL      eGFR Non African Amer 63 mL/min/1.73      BUN/Creatinine Ratio 42.9 (H)     Anion Gap 6.0 mmol/L     Narrative:       The MDRD GFR formula is only valid for adults with stable renal function between ages 18 and 70.    Lactic Acid, Reflex [[id number]]  (Abnormal) Collected:  11/07/18 2329    Specimen:  Blood Updated:  11/08/18 0032     Lactate 2.3 (C) mmol/L     Lactic Acid, Reflex Timer (This will reflex a repeat order 3-3:15 hours after ordered.) [[id number]] Collected:  11/07/18 1946    Specimen:  Blood Updated:  11/07/18 2315     Extra Tube Hold for add-ons.     Comment: Auto resulted.       Calcium, Ionized [[id number]]  (Abnormal) Collected:  11/07/18 1946    Specimen:  Blood Updated:  11/07/18 2225     Ionized Calcium 4.25 (L) mg/dL     Comprehensive Metabolic Panel [[id number]]  (Abnormal) Collected:  11/07/18 1945    Specimen:  Blood Updated:  11/07/18 2221     Glucose 216 (H) mg/dL      BUN 46 (H) mg/dL      Creatinine 1.23 mg/dL      Sodium 133 (L) mmol/L      Potassium 5.0 mmol/L      Chloride 95 mmol/L      CO2 30.0 mmol/L      Calcium 10.7 (H) mg/dL      Total Protein 7.4 g/dL      Albumin 4.30 g/dL      ALT (SGPT) 31 U/L      AST (SGOT) 26 U/L      Alkaline Phosphatase 99 U/L      Total Bilirubin 0.8 mg/dL      eGFR Non African Amer 57 mL/min/1.73      Globulin 3.1 gm/dL      A/G Ratio 1.4 g/dL      BUN/Creatinine Ratio 37.4 (H)     Anion Gap 8.0 mmol/L     Narrative:       The MDRD GFR formula is only valid for adults with stable renal function between ages 18 and 70.    Magnesium [[id number]]  (Normal) Collected:  11/07/18 1945    Specimen:  Blood Updated:  11/07/18 2221     Magnesium 1.9 mg/dL     Phosphorus [[id number]]  (Abnormal) Collected:  11/07/18 1945    Specimen:  Blood Updated:  11/07/18 2221     Phosphorus 5.2 (H) mg/dL     C-reactive Protein [[id number]]  (Normal) Collected:  11/07/18 1945    Specimen:  Blood Updated:   11/07/18 2221     C-Reactive Protein <0.50 mg/dL     [place] Draw [[id number]] Collected:  11/07/18 1945    Specimen:  Blood Updated:  11/07/18 2215    Narrative:       The following orders were created for panel order [place] Draw.  Procedure                               Abnormality         Status                     ---------                               -----------         ------                     Light Blue Top[[id number]]                                   Final result               Green Top (Gel)[[id number]]                                  Final result               Lavender Top[[id number]]                                     Final result               Gold Top - SST[[id number]]                                   Final result                 Please view results for these tests on the individual orders.    Green Top (Gel) [[id number]] Collected:  11/07/18 1946    Specimen:  Blood Updated:  11/07/18 2215     Extra Tube Hold for add-ons.     Comment: Auto resulted.       CBC & Differential [[id number]] Collected:  11/07/18 1945    Specimen:  Blood Updated:  11/07/18 2146    Narrative:       The following orders were created for panel order CBC & Differential.  Procedure                               Abnormality         Status                     ---------                               -----------         ------                     Scan Slide[[id number]]                                       Final result               CBC Auto Differential[[id number]]        Abnormal            Final result                 Please view results for these tests on the individual orders.    Scan Slide [[id number]] Collected:  11/07/18 1945    Specimen:  Blood Updated:  11/07/18 2146     RBC Morphology Normal     WBC Morphology Normal     Platelet Estimate Decreased    Urinalysis, Microscopic Only - Urine, Clean Catch [[id number]]  (Abnormal) Collected:  11/07/18 2102    Specimen:  Urine from Urine, Catheter Updated:  11/07/18 2142     RBC,  UA None Seen /HPF      WBC, UA 6-12 (A) /HPF      Bacteria, UA Trace (A) /HPF      Squamous Epithelial Cells, UA 0-2 /HPF      Hyaline Casts, UA 21-30 /LPF      Methodology Automated Microscopy    Urinalysis With Microscopic If Indicated (No Culture) - Urine, Catheter [[id number]]  (Abnormal) Collected:  11/07/18 2102    Specimen:  Urine from Urine, Catheter Updated:  11/07/18 2133     Color, UA Yellow     Appearance, UA Clear     pH, UA <=5.0     Specific Gravity, UA 1.025     Glucose, UA Negative     Ketones, UA Negative     Bilirubin, UA Negative     Blood, UA Negative     Protein, UA Negative     Leuk Esterase, UA Trace (A)     Nitrite, UA Negative     Urobilinogen, UA 0.2 E.U./dL    Light Blue Top [[id number]] Collected:  11/07/18 1945    Specimen:  Blood Updated:  11/07/18 2046     Extra Tube hold for add-on     Comment: Auto resulted       Lavender Top [[id number]] Collected:  11/07/18 1945    Specimen:  Blood Updated:  11/07/18 2046     Extra Tube hold for add-on     Comment: Auto resulted       Gold Top - SST [[id number]] Collected:  11/07/18 1945    Specimen:  Blood Updated:  11/07/18 2046     Extra Tube Hold for add-ons.     Comment: Auto resulted.       Protime-INR [[id number]]  (Normal) Collected:  11/07/18 1945    Specimen:  Blood Updated:  11/07/18 2021     Protime 13.9 Seconds      INR 1.09    Narrative:       Therapeutic range for most indications is 2.0-3.0 INR,  or 2.5-3.5 for mechanical heart valves.    aPTT [[id number]]  (Normal) Collected:  11/07/18 1945    Specimen:  Blood Updated:  11/07/18 2021     PTT 21.3 seconds     Narrative:       The recommended Heparin therapeutic range is 68-97 seconds.    Lactic Acid, Plasma [[id number]]  (Abnormal) Collected:  11/07/18 1946    Specimen:  Blood Updated:  11/07/18 2007     Lactate 2.6 (C) mmol/L     CBC Auto Differential [[id number]]  (Abnormal) Collected:  11/07/18 1945    Specimen:  Blood Updated:  11/07/18 2006     WBC 17.80 (H) 10*3/mm3      RBC 4.54  10*6/mm3      Hemoglobin 15.0 g/dL      Hematocrit 43.0 %      MCV 94.7 fL      MCH 33.0 pg      MCHC 34.9 g/dL      RDW 13.8 %      RDW-SD 47.3 (H) fl      MPV 10.3 fL      Platelets 132 (L) 10*3/mm3      Neutrophil % 91.1 (H) %      Lymphocyte % 5.4 (L) %      Monocyte % 2.9 %      Eosinophil % 0.0 %      Basophil % 0.1 %      Immature Grans % 0.5 %      Neutrophils, Absolute 16.22 (H) 10*3/mm3      Lymphocytes, Absolute 0.97 10*3/mm3      Monocytes, Absolute 0.51 10*3/mm3      Eosinophils, Absolute 0.00 10*3/mm3      Basophils, Absolute 0.01 10*3/mm3      Immature Grans, Absolute 0.09 (H) 10*3/mm3            Results Review:     Labs and imaging for today were reviewed.    Assessment/Plan     Montana Gonzalez is a 78 y.o. male who is has a reducible, large left inguinal hernia.      Will plan for surgery today to repair his hernia.  I discussed this with he and his wife and they wish to proceed.  NPO now.          This document has been electronically signed by Dallas Piña MD on November 8, 2018 8:43 AM        Dallas Piña MD  11/08/18  8:43 AM

## 2018-11-08 NOTE — ANESTHESIA POSTPROCEDURE EVALUATION
Patient: Montana Gonzalez    Procedure Summary     Date:  11/08/18 Room / Location:  [facility] OR [address] [place] [address] OR    Anesthesia Start:  1518 Anesthesia Stop:  1642    Procedure:  LEFT INGUINAL HERNIA REPAIR WITH MESH (Left Abdomen) Diagnosis:       Left inguinal hernia      (Left inguinal hernia [K40.90])    Surgeon:  Dallas Piña MD Provider:  Luke Cody MD    Anesthesia Type:  general ASA Status:  4          Anesthesia Type: general  Last vitals  BP   123/70 (11/08/18 1452)   Temp   98.2 °F (36.8 °C) (11/08/18 1452)   Pulse   99 (11/08/18 1452)   Resp   20 (11/08/18 1452)     SpO2   96 % (11/08/18 1452)     Post Anesthesia Care and Evaluation    Patient location during evaluation: bedside  Patient participation: complete - patient participated  Level of consciousness: awake  Pain score: 0  Pain management: adequate  Airway patency: patent  Anesthetic complications: No anesthetic complications  PONV Status: none  Cardiovascular status: acceptable  Respiratory status: acceptable  Hydration status: acceptable

## 2018-11-08 NOTE — BRIEF OP NOTE
INGUINAL HERNIA REPAIR  Progress Note    Montana Stephenson Lisa  11/7/2018 - 11/8/2018    Pre-op Diagnosis:   Left inguinal hernia [K40.90]       Post-Op Diagnosis Codes:     * Left inguinal hernia [K40.90]    Procedure/CPT® Codes:      Procedure(s):  LEFT INGUINAL HERNIA REPAIR WITH MESH    Surgeon(s):  Dallas Piña MD    Anesthesia: Choice    Staff:   Circulator: Maria Isabel Monroe RN; Kaitlyn Fernandez RN  Scrub Person: Nicky Butler; Michele Rm  Assistant: Tiffanie Martins CSA    Estimated Blood Loss: 5 mL    Urine Voided: * No values recorded between 11/8/2018  3:17 PM and 11/8/2018  4:36 PM *    Specimens:                None      Drains:      Findings: large indirect hernia    Complications: none          This document has been electronically signed by Dallas Piña MD on November 8, 2018 4:45 PM          Dallas Piña MD     Date: 11/8/2018  Time: 4:45 PM

## 2018-11-08 NOTE — H&P
History and Physical  James Phillips MD  Hospitalist    Date of admission: 11/7/2018    Patient Care Team:  Bigg kSy APRN as PCP -    Chief complaint   Chief Complaint   Patient presents with   • Abdominal Pain   • L groin pain   • Nausea     Subjective     Patient is a 78 y.o. male admitted for left-sided groin discomfort.  He has had a large inguinal hernia for the last several years, symptomatic now more often than usual.  I understand the pain is worse and he has been somewhat nauseated.    History  Past Medical History:   Diagnosis Date   • Aorta aneurysm (CMS/HCC)    • CAD (coronary artery disease)    • Chronic respiratory failure with hypoxia (CMS/HCC)    • COPD (chronic obstructive pulmonary disease) (CMS/HCC)    • Coronary arteriosclerosis    • Degenerative joint disease involving multiple joints    • Emphysema of lung (CMS/HCC)    • Hyperlipidemia    • Hypertension    • Inguinal hernia, left    • Osteoarthritis      Past Surgical History:   Procedure Laterality Date   • COLONOSCOPY     • CORONARY ANGIOPLASTY WITH STENT PLACEMENT     • CORONARY STENT PLACEMENT     • KYPHOPLASTY       Family History   Problem Relation Age of Onset   • Cancer Mother    • Heart disease Father      Social History   Substance Use Topics   • Smoking status: Former Smoker   • Smokeless tobacco: Current User     Types: Snuff   • Alcohol use No     Prescriptions Prior to Admission   Medication Sig Dispense Refill Last Dose   • allopurinol (ZYLOPRIM) 100 MG tablet Take 100 mg by mouth Daily.      • amLODIPine (NORVASC) 10 MG tablet 10 mg Daily.  6 Taking   • amLODIPine (NORVASC) 10 MG tablet Take 10 mg by mouth.   Taking   • atorvastatin (LIPITOR) 20 MG tablet Take 20 mg by mouth.   Taking   • azithromycin (ZITHROMAX) 250 MG tablet Take 250 mg by mouth 3 (Three) Times a Week. Monday, Wednesday, and Friday      • calcium carbonate (OS-NGOC) 600 MG tablet Take 600 mg by mouth 2 (Two) Times a Day With Meals.   Taking   • carvedilol  (COREG) 3.125 MG tablet 3.125 mg 2 (Two) Times a Day With Meals.  7 Taking   • hydrochlorothiazide (MICROZIDE) 12.5 MG capsule Take 12.5 mg by mouth Every Morning.   Taking   • lisinopril (PRINIVIL,ZESTRIL) 40 MG tablet Take 10 mg by mouth.   Taking   • predniSONE (DELTASONE) 10 MG tablet Take 10 mg by mouth Daily.      • SPIRIVA HANDIHALER 18 MCG per inhalation capsule   6 Taking   • tamsulosin (FLOMAX) 0.4 MG capsule 24 hr capsule 1 capsule Daily.  6 Taking   • acetaminophen (TYLENOL) 500 MG tablet Take 1 tablet by mouth Every 6 (Six) Hours As Needed for Mild Pain  or Moderate Pain . 20 tablet 0    • alendronate (FOSAMAX) 70 MG tablet Take 70 mg by mouth Daily.  6 Unknown   • aspirin 81 MG EC tablet Take 81 mg by mouth Daily.   Taking   • cyclobenzaprine (FLEXERIL) 5 MG tablet Take 1 tablet by mouth 3 (Three) Times a Day As Needed for Muscle Spasms. 9 tablet 0    • esomeprazole (NEXIUM) 40 MG capsule Take 40 mg by mouth.   Taking   • fluticasone-salmeterol (ADVAIR) 500-50 MCG/DOSE DISKUS Inhale 1 puff.   Taking   • PROAIR  (90 Base) MCG/ACT inhaler   6 Taking     Allergies:  Patient has no known allergies.    Review of Systems  Review of Systems   Constitutional: Positive for fatigue. Negative for fever.   Respiratory: Negative for shortness of breath, wheezing and stridor.    Gastrointestinal: Positive for nausea. Negative for abdominal distention, blood in stool and diarrhea.   Genitourinary: Negative for decreased urine volume, discharge, enuresis, frequency and testicular pain.   Musculoskeletal: Positive for back pain. Negative for arthralgias and gait problem.   Skin: Positive for pallor. Negative for color change and rash.   Neurological: Positive for weakness. Negative for syncope, numbness and headaches.   Psychiatric/Behavioral: Negative for agitation, behavioral problems and confusion.   All other systems reviewed and are negative.      Objective     Vital Signs  Temp:  [96.7 °F (35.9 °C)-97.4 °F  (36.3 °C)] 97.4 °F (36.3 °C)  Heart Rate:  [] 75  Resp:  [20-22] 20  BP: ()/(37-65) 103/49    Physical Exam:  Physical Exam   Constitutional: He is oriented to person, place, and time. He appears well-developed and well-nourished.   HENT:   Head: Normocephalic and atraumatic.   Eyes: Pupils are equal, round, and reactive to light. EOM are normal. No scleral icterus.   Neck: Normal range of motion. Neck supple.   Cardiovascular: Normal rate and regular rhythm.    Pulmonary/Chest: Effort normal and breath sounds normal. No respiratory distress. He has no wheezes.   Abdominal: Soft. Bowel sounds are normal. He exhibits no distension. There is no tenderness.   Reducible L sided inguinal hernia   Musculoskeletal: He exhibits no edema or tenderness.   Neurological: He is alert and oriented to person, place, and time. He displays normal reflexes. No cranial nerve deficit. Coordination normal.   Skin: Skin is warm and dry. No rash noted. There is pallor.   Psychiatric: He has a normal mood and affect. His behavior is normal.   Vitals reviewed.      Results Review:   Lab Results (last 24 hours)     Procedure Component Value Units Date/Time    Lactic Acid, Reflex [[id number]]  (Abnormal) Collected:  11/07/18 2329    Specimen:  Blood Updated:  11/08/18 0032     Lactate 2.3 (C) mmol/L     Lactic Acid, Reflex Timer (This will reflex a repeat order 3-3:15 hours after ordered.) [[id number]] Collected:  11/07/18 1946    Specimen:  Blood Updated:  11/07/18 2315     Extra Tube Hold for add-ons.     Comment: Auto resulted.       Calcium, Ionized [[id number]]  (Abnormal) Collected:  11/07/18 1946    Specimen:  Blood Updated:  11/07/18 2225     Ionized Calcium 4.25 (L) mg/dL     Comprehensive Metabolic Panel [[id number]]  (Abnormal) Collected:  11/07/18 1945    Specimen:  Blood Updated:  11/07/18 2221     Glucose 216 (H) mg/dL      BUN 46 (H) mg/dL      Creatinine 1.23 mg/dL      Sodium 133 (L) mmol/L      Potassium 5.0 mmol/L       Chloride 95 mmol/L      CO2 30.0 mmol/L      Calcium 10.7 (H) mg/dL      Total Protein 7.4 g/dL      Albumin 4.30 g/dL      ALT (SGPT) 31 U/L      AST (SGOT) 26 U/L      Alkaline Phosphatase 99 U/L      Total Bilirubin 0.8 mg/dL      eGFR Non African Amer 57 mL/min/1.73      Globulin 3.1 gm/dL      A/G Ratio 1.4 g/dL      BUN/Creatinine Ratio 37.4 (H)     Anion Gap 8.0 mmol/L     Narrative:       The MDRD GFR formula is only valid for adults with stable renal function between ages 18 and 70.    Magnesium [[id number]]  (Normal) Collected:  11/07/18 1945    Specimen:  Blood Updated:  11/07/18 2221     Magnesium 1.9 mg/dL     Phosphorus [[id number]]  (Abnormal) Collected:  11/07/18 1945    Specimen:  Blood Updated:  11/07/18 2221     Phosphorus 5.2 (H) mg/dL     C-reactive Protein [[id number]]  (Normal) Collected:  11/07/18 1945    Specimen:  Blood Updated:  11/07/18 2221     C-Reactive Protein <0.50 mg/dL     [place] Draw [[id number]] Collected:  11/07/18 1945    Specimen:  Blood Updated:  11/07/18 2215    Narrative:       The following orders were created for panel order [place] Draw.  Procedure                               Abnormality         Status                     ---------                               -----------         ------                     Light Blue Top[[id number]]                                   Final result               Green Top (Gel)[[id number]]                                  Final result               Lavender Top[[id number]]                                     Final result               Gold Top - SST[[id number]]                                   Final result                 Please view results for these tests on the individual orders.    Green Top (Gel) [[id number]] Collected:  11/07/18 1946    Specimen:  Blood Updated:  11/07/18 2215     Extra Tube Hold for add-ons.     Comment: Auto resulted.       CBC & Differential [[id number]] Collected:  11/07/18 1945    Specimen:  Blood Updated:   11/07/18 2146    Narrative:       The following orders were created for panel order CBC & Differential.  Procedure                               Abnormality         Status                     ---------                               -----------         ------                     Scan Slide[[id number]]                                       Final result               CBC Auto Differential[[id number]]        Abnormal            Final result                 Please view results for these tests on the individual orders.    Scan Slide [[id number]] Collected:  11/07/18 1945    Specimen:  Blood Updated:  11/07/18 2146     RBC Morphology Normal     WBC Morphology Normal     Platelet Estimate Decreased    Urinalysis, Microscopic Only - Urine, Clean Catch [[id number]]  (Abnormal) Collected:  11/07/18 2102    Specimen:  Urine from Urine, Catheter Updated:  11/07/18 2142     RBC, UA None Seen /HPF      WBC, UA 6-12 (A) /HPF      Bacteria, UA Trace (A) /HPF      Squamous Epithelial Cells, UA 0-2 /HPF      Hyaline Casts, UA 21-30 /LPF      Methodology Automated Microscopy    Urinalysis With Microscopic If Indicated (No Culture) - Urine, Catheter [[id number]]  (Abnormal) Collected:  11/07/18 2102    Specimen:  Urine from Urine, Catheter Updated:  11/07/18 2133     Color, UA Yellow     Appearance, UA Clear     pH, UA <=5.0     Specific Gravity, UA 1.025     Glucose, UA Negative     Ketones, UA Negative     Bilirubin, UA Negative     Blood, UA Negative     Protein, UA Negative     Leuk Esterase, UA Trace (A)     Nitrite, UA Negative     Urobilinogen, UA 0.2 E.U./dL    Light Blue Top [[id number]] Collected:  11/07/18 1945    Specimen:  Blood Updated:  11/07/18 2046     Extra Tube hold for add-on     Comment: Auto resulted       Lavender Top [[id number]] Collected:  11/07/18 1945    Specimen:  Blood Updated:  11/07/18 2046     Extra Tube hold for add-on     Comment: Auto resulted       Gold Top - SST [[id number]] Collected:  11/07/18 1945     Specimen:  Blood Updated:  11/07/18 2046     Extra Tube Hold for add-ons.     Comment: Auto resulted.       Protime-INR [[id number]]  (Normal) Collected:  11/07/18 1945    Specimen:  Blood Updated:  11/07/18 2021     Protime 13.9 Seconds      INR 1.09    Narrative:       Therapeutic range for most indications is 2.0-3.0 INR,  or 2.5-3.5 for mechanical heart valves.    aPTT [[id number]]  (Normal) Collected:  11/07/18 1945    Specimen:  Blood Updated:  11/07/18 2021     PTT 21.3 seconds     Narrative:       The recommended Heparin therapeutic range is 68-97 seconds.    Lactic Acid, Plasma [[id number]]  (Abnormal) Collected:  11/07/18 1946    Specimen:  Blood Updated:  11/07/18 2007     Lactate 2.6 (C) mmol/L     CBC Auto Differential [[id number]]  (Abnormal) Collected:  11/07/18 1945    Specimen:  Blood Updated:  11/07/18 2006     WBC 17.80 (H) 10*3/mm3      RBC 4.54 10*6/mm3      Hemoglobin 15.0 g/dL      Hematocrit 43.0 %      MCV 94.7 fL      MCH 33.0 pg      MCHC 34.9 g/dL      RDW 13.8 %      RDW-SD 47.3 (H) fl      MPV 10.3 fL      Platelets 132 (L) 10*3/mm3      Neutrophil % 91.1 (H) %      Lymphocyte % 5.4 (L) %      Monocyte % 2.9 %      Eosinophil % 0.0 %      Basophil % 0.1 %      Immature Grans % 0.5 %      Neutrophils, Absolute 16.22 (H) 10*3/mm3      Lymphocytes, Absolute 0.97 10*3/mm3      Monocytes, Absolute 0.51 10*3/mm3      Eosinophils, Absolute 0.00 10*3/mm3      Basophils, Absolute 0.01 10*3/mm3      Immature Grans, Absolute 0.09 (H) 10*3/mm3     Blood Culture - Blood, [[id number]] Collected:  11/07/18 2001    Specimen:  Blood from Arm, Right Updated:  11/07/18 1952    Blood Culture - Blood, [[id number]] Collected:  11/07/18 1946    Specimen:  Blood from Arm, Right Updated:  11/07/18 1951          Imaging Results (last 24 hours)     Procedure Component Value Units Date/Time    CT Abdomen Pelvis Without Contrast [[id number]] Collected:  11/07/18 2027     Updated:  11/07/18 2057    Narrative:         CT  abdomen and pelvis without contrast on 11/7/2018     CLINICAL INDICATION: Left lower quadrant pain, irreducible  inguinal hernia    TECHNIQUE: Multiple axial images are obtained throughout the  abdomen and pelvis without the administration of contrast. This  exam was performed according to our departmental  dose-optimization program, which includes automated exposure  control, adjustment of the mA and/or kV according to patient size  and/or use of iterative reconstruction technique.   Total DLP is 344.4 mGy*cm.    COMPARISON: CT chest and upper abdomen from 7/24/2017    FINDINGS:  Abdomen: Emphysematous changes of the lung bases are noted. There  is mild bronchiectasis. There is evidence of calcified  granulomatous disease in the lung bases. Vascular calcifications  are noted. There are no renal or ureteral stones and no  hydronephrosis. The unenhanced solid abdominal organs are  unremarkable. There is no abdominal adenopathy. There is no free  fluid or free air within the abdomen. The abdominal portion of  the GI tract is unremarkable.    Pelvis: There is a large left inguinal hernia containing small  bowel and colon. There is fat stranding and fluid in the  mesentery proximal to the hernia and within the hernia consistent  with some strangulation or incarceration. Recommend urgent  surgical consultation. There is also some small bowel dilatation  proximal to the hernia without significant obstruction noted.  Small amount of free fluid is noted in the pelvis. There is no  pelvic adenopathy. The pelvic portion of the GI tract is  otherwise unremarkable. Degenerative changes are noted in the  spine. The patient is status post vertebroplasty/kyphoplasty at  L4.      Impression:       1. Large left inguinal hernia that appears to have some  incarceration and/or strangulation, recommend urgent surgical  consultation.  2. Otherwise no acute abnormality.    Electronically signed by:  Chaparro Tejeda  11/7/2018 8:56  PM  CST Workstation: [id number]    XR Chest 1 View [[id number]] Collected:  11/07/18 2015     Updated:  11/07/18 2041    Narrative:         EXAM:         Radiograph(s), Chest   VIEWS:   Frontal  ; 1       DATE/TIME:  11/7/2018 8:38 PM CST                INDICATION:   Severe Sepsis triage protocol, R10.32 Left lower  quadrant pain K40.31 Unilateral inguinal hernia, with  obstruction, without gangrene, recurrent K40.90 Unilateral  inguinal hernia, without obstruction or gangrene, not specified  as recurrent R11.2 Nausea with vomiting, unspecified    COMPARISON:  CXR: 11/7/18             FINDINGS:             - lines/tubes:    none     - cardiac:         size within normal limits         - mediastinum: contour within normal limits         - lungs:         Focal interstitial/airspace changes in the left  base, possibly representing a small focus of pneumonia.              - pleura:         no evidence of  fluid                  - osseous:         unremarkable for age                  - misc.:         Impression:       CONCLUSION:        1. Focal interstitial/airspace changes in the left base may  represent a small focus of pneumonia.                                                             Electronically signed by:  EVAN Castillo MD  11/7/2018 8:40  PM CST Workstation: [id number]          Assessment/Plan       Inguinal hernia, left    Left lower quadrant pain    Emphysema of lung (CMS/HCC)    Chronic respiratory failure with hypoxia (CMS/HCC)    CAD (coronary artery disease)    Hyperlipidemia    Hypertension    We'll obtain surgical consult, continue with his current medications, the oxygen and nebulized treatments.    James Phillips MD  11/08/18  12:53 AM

## 2018-11-08 NOTE — ED PROVIDER NOTES
Subjective   78-year-old male presents to the ER with chief complaint of abdominal pain.  It started suddenly this afternoon.  Nothing seems to make it worse or better.  It is progressively getting worse pain wise.  Patient has nausea and had several episodes of vomiting.  He is unable to eat or drink anything because of the vomiting.  Patient denies passing gas during the pain started.  Patient does have a known inguinal hernia on the left.  He is usually able to reduce his hernia.  However, today he was unable to.  He noticed this roughly around the time that the pain started.         History provided by:  Patient, spouse and relative   used: No        Review of Systems   Constitutional: Positive for chills. Negative for activity change, appetite change, diaphoresis, fatigue and fever.   HENT: Negative for congestion, ear pain, rhinorrhea, sneezing and sore throat.    Eyes: Negative for pain, redness, itching and visual disturbance.   Respiratory: Positive for cough (chronic). Negative for choking, chest tightness, shortness of breath, wheezing and stridor.    Cardiovascular: Negative for chest pain, palpitations and leg swelling.   Gastrointestinal: Positive for abdominal pain, nausea and vomiting. Negative for abdominal distention, blood in stool, constipation, diarrhea and rectal pain.   Genitourinary: Positive for scrotal swelling (hernia). Negative for difficulty urinating, dysuria, flank pain and frequency.   Skin: Negative for color change and rash.   Neurological: Negative for dizziness, seizures, syncope, weakness, light-headedness, numbness and headaches.   Psychiatric/Behavioral: Negative for agitation, confusion, decreased concentration and sleep disturbance. The patient is not nervous/anxious.    All other systems reviewed and are negative.      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 Total occlusion of the distal right coronary artery   • COLONOSCOPY  2009   • CORONARY ANGIOPLASTY WITH STENT PLACEMENT     • CORONARY STENT PLACEMENT      x1   • ECHO - CONVERTED  04/16/2012    NRL LV syst func. EF above 65%. There is no evidence of regional wall motion abn. Mild concentric LVH.There is moderate TR valve regurg.No evodence of pericardial effusion   • KYPHOPLASTY      Percutaneous vertebral augmentation (kyphoplasty) (1)         Family History   Problem Relation Age of Onset   • Cancer Mother    • Heart disease Father    • Diabetes Other        Social History     Social History   • Marital status:      Social History Main Topics   • Smoking status: Former Smoker   • Smokeless tobacco: Current User     Types: Snuff   • Alcohol use No   • Drug use: No   • Sexual activity: Defer     Other Topics Concern   • Not on file     Social History Narrative    ** Merged History Encounter **                Objective    Vitals:    11/07/18 2106 11/07/18 2107 11/07/18 2203 11/07/18 2213   BP: (!) 75/50 92/54 106/65 106/53   BP Location:    Right arm   Patient Position:    Lying    Pulse: 84  82 80   Resp:    20   Temp:       SpO2:   96% 97%   Weight:       Height:         Physical Exam   Constitutional: He is oriented to person, place, and time. He appears well-developed and well-nourished. He is active.  Non-toxic appearance. He does not have a sickly appearance. He does not appear ill. No distress.   HENT:   Head: Normocephalic.   Right Ear: External ear normal. No lacerations. No swelling or tenderness.   Left Ear: External ear normal. No lacerations. No swelling or tenderness.   Nose: Nose normal.   Mouth/Throat: Uvula is midline, oropharynx is clear and moist and mucous membranes are normal.   Eyes: Pupils are equal, round, and reactive to light. Conjunctivae, EOM and lids are normal.   Cardiovascular: Normal heart sounds and intact distal pulses.    Pulmonary/Chest: Effort normal and breath sounds normal. No accessory muscle usage. No respiratory distress. He has no decreased breath sounds. He has no wheezes. He has no rhonchi. He has no rales. He exhibits no tenderness.   Abdominal: Soft. Bowel sounds are normal. There is tenderness in the left lower quadrant. There is no rigidity, no rebound and no guarding. A hernia is present. Hernia confirmed positive in the left inguinal area. Hernia confirmed negative in the right inguinal area.   Genitourinary: Penis normal. Circumcised.   Genitourinary Comments: Enlarged scrotum secondary to hernia.   Musculoskeletal:        Right lower leg: He exhibits no swelling.        Left lower leg: He exhibits no swelling.        Right foot: There is no swelling.        Left foot: There is no swelling.     Vascular Status -  His right foot exhibits no edema. His left foot exhibits no edema.  Lymphadenopathy:     He has no cervical adenopathy.   Neurological: He is alert and oriented to person, place, and time. He has normal strength. He is not disoriented. No cranial nerve deficit (grossly intact). He exhibits normal muscle tone. GCS eye subscore is 4.  "GCS verbal subscore is 5. GCS motor subscore is 6.   Skin: Skin is warm and dry. Capillary refill takes less than 2 seconds. No rash noted. He is not diaphoretic.   Nursing note and vitals reviewed.      Procedures           ED Course  ED Course as of Nov 07 2231 Wed Nov 07, 2018   2016 Patient has a very large left inguinal hernia that protrudes into his scrotum.  We were able to reduce it in the ER.  Patient states he is feeling immediately better after the hernia reduction.  []   2126 CT abdomen shows a large left inguinal hernia that \"appears to have some incarceration and/or strangulation.\"  Radiology recommends urgent surgical consult.  Spoke with the general surgeon on-call who is coming to evaluate the patient.   []   2214 General surgery spoke with the patient and family and evaluated the patient.  His recommendation is to bring the patient into the hospital for rehydration and observation.  There'll be a discussion tomorrow with the patient's pulmonologist regarding anesthesia for hernia repair possibly this week.  Apparently, the patient was told by his lung doctor that he would most likely die if he ever had anesthesia.  Gen. surgery recommends admission to the hospitalist service.  I will page the hospitalist on call to discuss the patient's case.  []   2231 Discussed patient with the hospitalist on call.  He agreed to bring him into the hospital for further observation and possible treatment.  []      ED Course User Index  [] Camacho Kerr MD      Results for orders placed or performed during the hospital encounter of 11/07/18   Comprehensive Metabolic Panel   Result Value Ref Range    Glucose 216 (H) 60 - 100 mg/dL    BUN 46 (H) 7 - 21 mg/dL    Creatinine 1.23 0.70 - 1.30 mg/dL    Sodium 133 (L) 137 - 145 mmol/L    Potassium 5.0 3.5 - 5.1 mmol/L    Chloride 95 95 - 110 mmol/L    CO2 30.0 22.0 - 31.0 mmol/L    Calcium 10.7 (H) 8.4 - 10.2 mg/dL    Total Protein 7.4 6.3 - 8.6 g/dL    " Albumin 4.30 3.40 - 4.80 g/dL    ALT (SGPT) 31 21 - 72 U/L    AST (SGOT) 26 17 - 59 U/L    Alkaline Phosphatase 99 38 - 126 U/L    Total Bilirubin 0.8 0.2 - 1.3 mg/dL    eGFR Non African Amer 57 42 - 98 mL/min/1.73    Globulin 3.1 2.3 - 3.5 gm/dL    A/G Ratio 1.4 1.1 - 1.8 g/dL    BUN/Creatinine Ratio 37.4 (H) 7.0 - 25.0    Anion Gap 8.0 5.0 - 15.0 mmol/L   Protime-INR   Result Value Ref Range    Protime 13.9 11.1 - 15.3 Seconds    INR 1.09 0.80 - 1.20   aPTT   Result Value Ref Range    PTT 21.3 20.0 - 40.3 seconds   Magnesium   Result Value Ref Range    Magnesium 1.9 1.6 - 2.3 mg/dL   Phosphorus   Result Value Ref Range    Phosphorus 5.2 (H) 2.4 - 4.4 mg/dL   Calcium, Ionized   Result Value Ref Range    Ionized Calcium 4.25 (L) 4.60 - 5.60 mg/dL   Lactic Acid, Plasma   Result Value Ref Range    Lactate 2.6 (C) 0.5 - 2.0 mmol/L   C-reactive Protein   Result Value Ref Range    C-Reactive Protein <0.50 0.00 - 1.00 mg/dL   CBC Auto Differential   Result Value Ref Range    WBC 17.80 (H) 3.20 - 9.80 10*3/mm3    RBC 4.54 4.37 - 5.74 10*6/mm3    Hemoglobin 15.0 13.7 - 17.3 g/dL    Hematocrit 43.0 39.0 - 49.0 %    MCV 94.7 80.0 - 98.0 fL    MCH 33.0 26.5 - 34.0 pg    MCHC 34.9 31.5 - 36.3 g/dL    RDW 13.8 11.5 - 14.5 %    RDW-SD 47.3 (H) 35.1 - 43.9 fl    MPV 10.3 8.0 - 12.0 fL    Platelets 132 (L) 150 - 450 10*3/mm3    Neutrophil % 91.1 (H) 37.0 - 80.0 %    Lymphocyte % 5.4 (L) 10.0 - 50.0 %    Monocyte % 2.9 0.0 - 12.0 %    Eosinophil % 0.0 0.0 - 7.0 %    Basophil % 0.1 0.0 - 2.0 %    Immature Grans % 0.5 0.0 - 0.5 %    Neutrophils, Absolute 16.22 (H) 2.00 - 8.60 10*3/mm3    Lymphocytes, Absolute 0.97 0.60 - 4.20 10*3/mm3    Monocytes, Absolute 0.51 0.00 - 0.90 10*3/mm3    Eosinophils, Absolute 0.00 0.00 - 0.70 10*3/mm3    Basophils, Absolute 0.01 0.00 - 0.20 10*3/mm3    Immature Grans, Absolute 0.09 (H) 0.00 - 0.02 10*3/mm3   Urinalysis With Microscopic If Indicated (No Culture) - Urine, Catheter   Result Value Ref Range     Color, UA Yellow Yellow, Straw, Dark Yellow, Nicky    Appearance, UA Clear Clear    pH, UA <=5.0 5.0 - 9.0    Specific Gravity, UA 1.025 1.003 - 1.030    Glucose, UA Negative Negative    Ketones, UA Negative Negative    Bilirubin, UA Negative Negative    Blood, UA Negative Negative    Protein, UA Negative Negative    Leuk Esterase, UA Trace (A) Negative    Nitrite, UA Negative Negative    Urobilinogen, UA 0.2 E.U./dL 0.2 - 1.0 E.U./dL   Scan Slide   Result Value Ref Range    RBC Morphology Normal Normal    WBC Morphology Normal Normal    Platelet Estimate Decreased Normal   Urinalysis, Microscopic Only - Urine, Clean Catch   Result Value Ref Range    RBC, UA None Seen None Seen /HPF    WBC, UA 6-12 (A) None Seen, 0-2, 3-5 /HPF    Bacteria, UA Trace (A) None Seen /HPF    Squamous Epithelial Cells, UA 0-2 None Seen, 0-2 /HPF    Hyaline Casts, UA 21-30 None Seen /LPF    Methodology Automated Microscopy    Light Blue Top   Result Value Ref Range    Extra Tube hold for add-on    Green Top (Gel)   Result Value Ref Range    Extra Tube Hold for add-ons.    Lavender Top   Result Value Ref Range    Extra Tube hold for add-on    Gold Top - SST   Result Value Ref Range    Extra Tube Hold for add-ons.      CT Abdomen Pelvis Without Contrast   Final Result   1. Large left inguinal hernia that appears to have some   incarceration and/or strangulation, recommend urgent surgical   consultation.   2. Otherwise no acute abnormality.      Electronically signed by:  Chaparro Tejeda  11/7/2018 8:56 PM   CST Workstation: [id number]      XR Chest 1 View   Final Result   CONCLUSION:          1. Focal interstitial/airspace changes in the left base may   represent a small focus of pneumonia.                                                                  Electronically signed by:  EVAN Castillo MD  11/7/2018 8:40   PM CST Workstation: [id number]              [facility]  Number of Diagnoses or Management Options  Left lower quadrant pain:  new and requires workup  Nausea and vomiting, intractability of vomiting not specified, unspecified vomiting type: new and requires workup  Recurrent unilateral inguinal hernia with obstruction and without gangrene: new and requires workup  Reducible left inguinal hernia: new and requires workup     Amount and/or Complexity of Data Reviewed  Clinical lab tests: ordered and reviewed  Tests in the radiology section of CPT®: reviewed and ordered  Tests in the medicine section of CPT®: reviewed and ordered  Decide to obtain previous medical records or to obtain history from someone other than the patient: yes  Obtain history from someone other than the patient: yes  Review and summarize past medical records: yes  Discuss the patient with other providers: yes  Independent visualization of images, tracings, or specimens: yes    Risk of Complications, Morbidity, and/or Mortality  Presenting problems: high  Diagnostic procedures: moderate  Management options: moderate    Patient Progress  Patient progress: improved        Final diagnoses:   Left lower quadrant pain   Recurrent unilateral inguinal hernia with obstruction and without gangrene   Reducible left inguinal hernia   Nausea and vomiting, intractability of vomiting not specified, unspecified vomiting type           This document has been electronically signed by Camacho Kerr MD on November 7, 2018 10:31 PM     Camacho Kerr MD  Resident  11/07/18 [id number]

## 2018-11-08 NOTE — CONSULTS
GENERAL SURGERY CONSULT    Referring Provider: ANABELLE Kerr  Reason for Consultation: Inguinal hernia    Patient Care Team:  Bigg Sky APRN as PCP - General (Family Medicine)  Bigg Sky APRN (Family Medicine)    Chief complaint Left Groin pain, hernia    Subjective .     History of present illness:  79 yo man who comes to the ER tonight with pain from a long standing left inguinal hernia.  He says the hernia has been present since about age 24 and has slowly enlarged.  He had pain related to the hernia earlier this afternoon and was not able to reduce the hernia at home which prompted his ER visit.  His hernia was reducible in the ER, but due to concerns based on a follow up CT I was asked to see the patient.    He currently reports no pain related to the hernia.  He did have nausea earlier today, but attributes that to the pain.  He notes no change in bowel function.      He has significant COPD and is on 2-3L NC O2 at home at baseline.  He has previously been told that due to his pulmonary status he should not have surgery (to repair an aortic aneurysm).      Review of Systems    Review of Systems   Constitutional: Positive for appetite change. Negative for chills and fever.   Respiratory: Positive for cough, shortness of breath and wheezing.    Gastrointestinal: Positive for abdominal pain and nausea.         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 Total occlusion of the distal right coronary artery   • COLONOSCOPY  2009   • CORONARY ANGIOPLASTY WITH STENT PLACEMENT     • CORONARY STENT PLACEMENT      x1   • ECHO - CONVERTED  04/16/2012    NRL LV syst func. EF above 65%. There is no evidence of regional wall motion abn. Mild concentric LVH.There is moderate TR valve regurg.No evodence of pericardial effusion   • KYPHOPLASTY      Percutaneous vertebral augmentation (kyphoplasty) (1)     , Family History   Problem Relation Age of Onset   • Cancer Mother    • Heart disease Father    • Diabetes Other    , Social History   Substance Use Topics   • Smoking status: Former Smoker   • Smokeless tobacco: Current User     Types: Snuff   • Alcohol use No   ,   (Not in a hospital admission), Scheduled Meds:   , Continuous Infusions:    sodium chloride 100 mL/hr Last Rate: 100 mL/hr (11/07/18 2128)   , PRN Meds:  •  sodium chloride and Allergies:  Patient has no known allergies.    Home Meds:  Tylenol  Flexeril  Coreg  norvasc  Spiriva  Flomax  Fosamax  ASA 81mg    Objective     Vital Signs   Temp:  [96.7 °F (35.9 °C)] 96.7 °F (35.9 °C)  Heart Rate:  [] 80  Resp:  [20-22] 20  BP: ()/(37-65) 106/53    Physical Exam:       General Appearance:    Alert, cooperative, in no acute distress   Head:    Normocephalic, without obvious abnormality, atraumatic   Eyes:            Lids and lashes normal, conjunctivae and sclerae normal, no   icterus, no pallor, corneas clear, PERRLA   Ears:    Ears appear intact with no abnormalities noted   Throat:   No oral lesions, no thrush, oral mucosa moist   Neck:   No adenopathy,  supple, trachea midline, no JVD   Lungs:     Diminished breath sounds bilaterally, no retractions or accessory muscle use    Heart:    Regular rhythm and normal rate, normal S1 and S2, no            murmur, no gallop, no rub, no click   Chest Wall:    No abnormalities observed   Abdomen:     Soft, nondistended, nontender, large left inguinal hernia, reduced with gentle manipulation   Extremities:   Moves all extremities well, no edema, no cyanosis, no             redness   Neurologic:   Cranial nerves 2 - 12 grossly intact, sensation intact       Results Review:  Lab Results (last 72 hours)     Procedure Component Value Units Date/Time    Calcium, Ionized [[id number]]  (Abnormal) Collected:  11/07/18 1946    Specimen:  Blood Updated:  11/07/18 2225     Ionized Calcium 4.25 (L) mg/dL     Comprehensive Metabolic Panel [[id number]]  (Abnormal) Collected:  11/07/18 1945    Specimen:  Blood Updated:  11/07/18 2221     Glucose 216 (H) mg/dL      BUN 46 (H) mg/dL      Creatinine 1.23 mg/dL      Sodium 133 (L) mmol/L      Potassium 5.0 mmol/L      Chloride 95 mmol/L      CO2 30.0 mmol/L      Calcium 10.7 (H) mg/dL      Total Protein 7.4 g/dL      Albumin 4.30 g/dL      ALT (SGPT) 31 U/L      AST (SGOT) 26 U/L      Alkaline Phosphatase 99 U/L      Total Bilirubin 0.8 mg/dL      eGFR Non African Amer 57 mL/min/1.73      Globulin 3.1 gm/dL      A/G Ratio 1.4 g/dL      BUN/Creatinine Ratio 37.4 (H)     Anion Gap 8.0 mmol/L     Narrative:       The MDRD GFR formula is only valid for adults with stable renal function between ages 18 and 70.    Magnesium [[id number]]  (Normal) Collected:  11/07/18 1945    Specimen:  Blood Updated:  11/07/18 2221     Magnesium 1.9 mg/dL     Phosphorus [[id number]]  (Abnormal) Collected:  11/07/18 1945    Specimen:  Blood Updated:  11/07/18 2221     Phosphorus 5.2 (H) mg/dL     C-reactive Protein [[id number]]  (Normal) Collected:  11/07/18 1945    Specimen:  Blood Updated:  11/07/18 2221     C-Reactive  Protein <0.50 mg/dL     [place] Draw [[id number]] Collected:  11/07/18 1945    Specimen:  Blood Updated:  11/07/18 2215    Narrative:       The following orders were created for panel order [place] Draw.  Procedure                               Abnormality         Status                     ---------                               -----------         ------                     Light Blue Top[[id number]]                                   Final result               Green Top (Gel)[[id number]]                                  Final result               Lavender Top[[id number]]                                     Final result               Gold Top - SST[[id number]]                                   Final result                 Please view results for these tests on the individual orders.    Green Top (Gel) [[id number]] Collected:  11/07/18 1946    Specimen:  Blood Updated:  11/07/18 2215     Extra Tube Hold for add-ons.     Comment: Auto resulted.       CBC & Differential [[id number]] Collected:  11/07/18 1945    Specimen:  Blood Updated:  11/07/18 2146    Narrative:       The following orders were created for panel order CBC & Differential.  Procedure                               Abnormality         Status                     ---------                               -----------         ------                     Scan Slide[[id number]]                                       Final result               CBC Auto Differential[[id number]]        Abnormal            Final result                 Please view results for these tests on the individual orders.    Scan Slide [[id number]] Collected:  11/07/18 1945    Specimen:  Blood Updated:  11/07/18 2146     RBC Morphology Normal     WBC Morphology Normal     Platelet Estimate Decreased    Urinalysis, Microscopic Only - Urine, Clean Catch [[id number]]  (Abnormal) Collected:  11/07/18 2102    Specimen:  Urine from Urine, Catheter Updated:  11/07/18 2142     RBC, UA None Seen /HPF      WBC, UA  6-12 (A) /HPF      Bacteria, UA Trace (A) /HPF      Squamous Epithelial Cells, UA 0-2 /HPF      Hyaline Casts, UA 21-30 /LPF      Methodology Automated Microscopy    Urinalysis With Microscopic If Indicated (No Culture) - Urine, Catheter [[id number]]  (Abnormal) Collected:  11/07/18 2102    Specimen:  Urine from Urine, Catheter Updated:  11/07/18 2133     Color, UA Yellow     Appearance, UA Clear     pH, UA <=5.0     Specific Gravity, UA 1.025     Glucose, UA Negative     Ketones, UA Negative     Bilirubin, UA Negative     Blood, UA Negative     Protein, UA Negative     Leuk Esterase, UA Trace (A)     Nitrite, UA Negative     Urobilinogen, UA 0.2 E.U./dL    Light Blue Top [[id number]] Collected:  11/07/18 1945    Specimen:  Blood Updated:  11/07/18 2046     Extra Tube hold for add-on     Comment: Auto resulted       Lavender Top [[id number]] Collected:  11/07/18 1945    Specimen:  Blood Updated:  11/07/18 2046     Extra Tube hold for add-on     Comment: Auto resulted       Gold Top - SST [[id number]] Collected:  11/07/18 1945    Specimen:  Blood Updated:  11/07/18 2046     Extra Tube Hold for add-ons.     Comment: Auto resulted.       Protime-INR [[id number]]  (Normal) Collected:  11/07/18 1945    Specimen:  Blood Updated:  11/07/18 2021     Protime 13.9 Seconds      INR 1.09    Narrative:       Therapeutic range for most indications is 2.0-3.0 INR,  or 2.5-3.5 for mechanical heart valves.    aPTT [[id number]]  (Normal) Collected:  11/07/18 1945    Specimen:  Blood Updated:  11/07/18 2021     PTT 21.3 seconds     Narrative:       The recommended Heparin therapeutic range is 68-97 seconds.    Lactic Acid, Plasma [[id number]]  (Abnormal) Collected:  11/07/18 1946    Specimen:  Blood Updated:  11/07/18 2007     Lactate 2.6 (C) mmol/L     Lactic Acid, Reflex Timer (This will reflex a repeat order 3-3:15 hours after ordered.) [[id number]] Collected:  11/07/18 1946    Specimen:  Blood Updated:  11/07/18 2007    CBC Auto  Differential [[id number]]  (Abnormal) Collected:  11/07/18 1945    Specimen:  Blood Updated:  11/07/18 2006     WBC 17.80 (H) 10*3/mm3      RBC 4.54 10*6/mm3      Hemoglobin 15.0 g/dL      Hematocrit 43.0 %      MCV 94.7 fL      MCH 33.0 pg      MCHC 34.9 g/dL      RDW 13.8 %      RDW-SD 47.3 (H) fl      MPV 10.3 fL      Platelets 132 (L) 10*3/mm3      Neutrophil % 91.1 (H) %      Lymphocyte % 5.4 (L) %      Monocyte % 2.9 %      Eosinophil % 0.0 %      Basophil % 0.1 %      Immature Grans % 0.5 %      Neutrophils, Absolute 16.22 (H) 10*3/mm3      Lymphocytes, Absolute 0.97 10*3/mm3      Monocytes, Absolute 0.51 10*3/mm3      Eosinophils, Absolute 0.00 10*3/mm3      Basophils, Absolute 0.01 10*3/mm3      Immature Grans, Absolute 0.09 (H) 10*3/mm3     Blood Culture - Blood, [[id number]] Collected:  11/07/18 2001    Specimen:  Blood from Arm, Right Updated:  11/07/18 1952    Blood Culture - Blood, [[id number]] Collected:  11/07/18 1946    Specimen:  Blood from Arm, Right Updated:  11/07/18 1951        Imaging Results (last 72 hours)     Procedure Component Value Units Date/Time    CT Abdomen Pelvis Without Contrast [[id number]] Collected:  11/07/18 2027     Updated:  11/07/18 2057    Narrative:         CT abdomen and pelvis without contrast on 11/7/2018     CLINICAL INDICATION: Left lower quadrant pain, irreducible  inguinal hernia    TECHNIQUE: Multiple axial images are obtained throughout the  abdomen and pelvis without the administration of contrast. This  exam was performed according to our departmental  dose-optimization program, which includes automated exposure  control, adjustment of the mA and/or kV according to patient size  and/or use of iterative reconstruction technique.   Total DLP is 344.4 mGy*cm.    COMPARISON: CT chest and upper abdomen from 7/24/2017    FINDINGS:  Abdomen: Emphysematous changes of the lung bases are noted. There  is mild bronchiectasis. There is evidence of calcified  granulomatous  disease in the lung bases. Vascular calcifications  are noted. There are no renal or ureteral stones and no  hydronephrosis. The unenhanced solid abdominal organs are  unremarkable. There is no abdominal adenopathy. There is no free  fluid or free air within the abdomen. The abdominal portion of  the GI tract is unremarkable.    Pelvis: There is a large left inguinal hernia containing small  bowel and colon. There is fat stranding and fluid in the  mesentery proximal to the hernia and within the hernia consistent  with some strangulation or incarceration. Recommend urgent  surgical consultation. There is also some small bowel dilatation  proximal to the hernia without significant obstruction noted.  Small amount of free fluid is noted in the pelvis. There is no  pelvic adenopathy. The pelvic portion of the GI tract is  otherwise unremarkable. Degenerative changes are noted in the  spine. The patient is status post vertebroplasty/kyphoplasty at  L4.      Impression:       1. Large left inguinal hernia that appears to have some  incarceration and/or strangulation, recommend urgent surgical  consultation.  2. Otherwise no acute abnormality.    Electronically signed by:  Chaparro Tejeda  11/7/2018 8:56 PM  CST Workstation: [id number]    XR Chest 1 View [[id number]] Collected:  11/07/18 2015     Updated:  11/07/18 2041    Narrative:         EXAM:         Radiograph(s), Chest   VIEWS:   Frontal  ; 1       DATE/TIME:  11/7/2018 8:38 PM CST                INDICATION:   Severe Sepsis triage protocol, R10.32 Left lower  quadrant pain K40.31 Unilateral inguinal hernia, with  obstruction, without gangrene, recurrent K40.90 Unilateral  inguinal hernia, without obstruction or gangrene, not specified  as recurrent R11.2 Nausea with vomiting, unspecified    COMPARISON:  CXR: 11/7/18             FINDINGS:             - lines/tubes:    none     - cardiac:         size within normal limits         - mediastinum: contour within  normal limits         - lungs:         Focal interstitial/airspace changes in the left  base, possibly representing a small focus of pneumonia.              - pleura:         no evidence of  fluid                  - osseous:         unremarkable for age                  - misc.:         Impression:       CONCLUSION:        1. Focal interstitial/airspace changes in the left base may  represent a small focus of pneumonia.                                                             Electronically signed by:  EVAN Castillo MD  11/7/2018 8:40  PM CST Workstation: [id number]          I reviewed the patient's new clinical results.  I reviewed the patient's new imaging results and agree with the interpretation.  I reviewed the patient's other test results and agree with the interpretation      Assessment/Plan       Left lower quadrant pain      79 yo man with a reducible large left inguinal hernia, COPD, dehydration.    I discussed several options with the patient and his family:    1. Discharge home with outpatient follow up as his hernia is reducible and therefore repair is not emergent.  2. Immediate surgery given the concern for incarceration.  3. Admission to the hospital for rehydration and medical optimization so that he can undergo semi-elective repair of his hernia, possibly under local anesthesia.    He and his family are agreeable with admission and medical optimization and rehydration.  Will discuss case further with hospitalist and the patient's pulmonologist (Dr. Ward in [place]).    I discussed the patient's findings and my recommendations with patient, family, primary care team and consulting provider              This document has been electronically signed by Dallas Piña MD on November 7, 2018 10:38 PM      Dallas Piña MD  11/07/18  10:38 PM

## 2018-11-09 VITALS
BODY MASS INDEX: 22.93 KG/M2 | HEART RATE: 73 BPM | HEIGHT: 68 IN | WEIGHT: 151.3 LBS | RESPIRATION RATE: 18 BRPM | TEMPERATURE: 97.5 F | OXYGEN SATURATION: 92 % | SYSTOLIC BLOOD PRESSURE: 118 MMHG | DIASTOLIC BLOOD PRESSURE: 64 MMHG

## 2018-11-09 PROCEDURE — 25010000002 CEFAZOLIN PER 500 MG: Performed by: SURGERY

## 2018-11-09 PROCEDURE — A9270 NON-COVERED ITEM OR SERVICE: HCPCS | Performed by: INTERNAL MEDICINE

## 2018-11-09 PROCEDURE — A9270 NON-COVERED ITEM OR SERVICE: HCPCS | Performed by: SURGERY

## 2018-11-09 PROCEDURE — 63710000001 HYDROCODONE-ACETAMINOPHEN 5-325 MG TABLET: Performed by: SURGERY

## 2018-11-09 PROCEDURE — 63710000001 LISINOPRIL 10 MG TABLET: Performed by: INTERNAL MEDICINE

## 2018-11-09 PROCEDURE — G0378 HOSPITAL OBSERVATION PER HR: HCPCS

## 2018-11-09 PROCEDURE — 63710000001 CARVEDILOL 3.125 MG TABLET: Performed by: INTERNAL MEDICINE

## 2018-11-09 PROCEDURE — 99024 POSTOP FOLLOW-UP VISIT: CPT | Performed by: SURGERY

## 2018-11-09 RX ORDER — HYDROCODONE BITARTRATE AND ACETAMINOPHEN 5; 325 MG/1; MG/1
1 TABLET ORAL EVERY 4 HOURS PRN
Qty: 30 TABLET | Refills: 0 | Status: SHIPPED | OUTPATIENT
Start: 2018-11-09

## 2018-11-09 RX ADMIN — HYDROCODONE BITARTRATE AND ACETAMINOPHEN 1 TABLET: 5; 325 TABLET ORAL at 03:59

## 2018-11-09 RX ADMIN — SODIUM CHLORIDE 125 ML/HR: 9 INJECTION, SOLUTION INTRAVENOUS at 03:55

## 2018-11-09 RX ADMIN — CARVEDILOL 3.12 MG: 3.12 TABLET, FILM COATED ORAL at 09:37

## 2018-11-09 RX ADMIN — SODIUM CHLORIDE 2 G: 9 INJECTION, SOLUTION INTRAVENOUS at 03:55

## 2018-11-09 RX ADMIN — LISINOPRIL 10 MG: 10 TABLET ORAL at 09:36

## 2018-11-09 NOTE — PROGRESS NOTES
[facility] Medicine Services  INPATIENT PROGRESS NOTE    Length of Stay: 0  Date of Admission: 11/7/2018  Primary Care Physician: Bigg Sky APRN    Subjective     Chief Complaint: Left-sided groin discomfort    HPI:  Patient was admitted for left-sided groin discomfort secondary to large inguinal hernia yesterday morning.  Hernia was reduced successfully and patient had successful hernia repair yesterday.     This morning, he has no complaints.  He states that he is passing gas and has no pain in his groin region.    Review of Systems   Constitutional: Negative for appetite change, chills, fatigue and fever.   HENT: Negative for congestion and sore throat.    Eyes: Negative for pain and redness.   Respiratory: Negative for cough, chest tightness, shortness of breath and wheezing.    Cardiovascular: Negative for chest pain, palpitations and leg swelling.   Gastrointestinal: Negative for abdominal pain, constipation, diarrhea, nausea and vomiting.   Genitourinary: Negative for dysuria and hematuria.   Musculoskeletal: Negative for arthralgias, joint swelling and neck pain.   Skin: Negative for color change and rash.   Neurological: Negative for dizziness, syncope, light-headedness and headaches.   Hematological: Negative for adenopathy.   Psychiatric/Behavioral: Negative for agitation and confusion. The patient is not nervous/anxious.      Objective    Temp:  [97 °F (36.1 °C)-98.2 °F (36.8 °C)] 97.5 °F (36.4 °C)  Heart Rate:  [63-99] 73  Resp:  [16-20] 18  BP: (106-123)/(49-70) 118/64    Physical Exam   Constitutional: He is oriented to person, place, and time. He appears well-developed and well-nourished. No distress.   HENT:   Head: Normocephalic and atraumatic.   Mouth/Throat: Oropharynx is clear and moist.   2 L of oxygen/min by nasal cannula in situ.   Eyes: Pupils are equal, round, and reactive to light. Conjunctivae and EOM are normal.   Neck: Normal range of  motion. Neck supple. No JVD present. No tracheal deviation present. No thyromegaly present.   Cardiovascular: Normal rate, regular rhythm, normal heart sounds and intact distal pulses.  Exam reveals no gallop and no friction rub.    No murmur heard.  Pulmonary/Chest: Effort normal and breath sounds normal. No stridor. No respiratory distress. He has no wheezes. He has no rales. He exhibits no tenderness.   Abdominal: Soft. Bowel sounds are normal. He exhibits no distension and no mass. There is no tenderness. There is no rebound and no guarding. No hernia.   Left inguinal herniorrhaphy incision clean and dry..   Musculoskeletal: Normal range of motion. He exhibits no edema, tenderness or deformity.   Lymphadenopathy:     He has no cervical adenopathy.   Neurological: He is alert and oriented to person, place, and time. No cranial nerve deficit or sensory deficit. He exhibits normal muscle tone. Coordination normal.   Skin: Skin is warm and dry. No rash noted. He is not diaphoretic. No erythema. No pallor.   Psychiatric: He has a normal mood and affect. His behavior is normal. Judgment and thought content normal.       Medication Review:    Current Facility-Administered Medications:   •  [MAR Hold] albuterol (PROVENTIL) nebulizer solution 0.083% 2.5 mg/3mL, 2.5 mg, Nebulization, 4x Daily - RT, James Phillips MD, 2.5 mg at 11/08/18 1107  •  [MAR Hold] allopurinol (ZYLOPRIM) tablet 100 mg, 100 mg, Oral, Daily, James Phillips MD  •  [MAR Hold] aspirin EC tablet 81 mg, 81 mg, Oral, Daily, James Phillips MD  •  [MAR Hold] atorvastatin (LIPITOR) tablet 20 mg, 20 mg, Oral, Daily, James Phillips MD  •  [MAR Hold] budesonide-formoterol (SYMBICORT) 160-4.5 MCG/ACT inhaler 2 puff, 2 puff, Inhalation, BID - RT, James Phillips MD, 2 puff at 11/08/18 0811  •  bupivacaine-EPINEPHrine PF (MARCAINE w/EPI) 0.5% -1:[id number] injection, , , PRN, Dallas Piña MD, 10 mL at 11/08/18 1554  •  carvedilol (COREG) tablet 3.125  mg, 3.125 mg, Oral, BID With Meals, James Phillips MD, 3.125 mg at 11/09/18 0937  •  [MAR Hold] cefTRIAXone (ROCEPHIN) 1 g/100 mL 0.9% NS (MBP), 1 g, Intravenous, Q24H, James Phillips MD, Stopped at 11/08/18 0730  •  docusate sodium (COLACE) capsule 100 mg, 100 mg, Oral, BID PRN, Dallas Piña MD  •  HYDROcodone-acetaminophen (NORCO) 5-325 MG per tablet 1 tablet, 1 tablet, Oral, Q4H PRN, Dallas Piña MD, 1 tablet at 11/09/18 0359  •  HYDROmorphone (DILAUDID) injection 0.5 mg, 0.5 mg, Intravenous, Q2H PRN **AND** naloxone (NARCAN) injection 0.1 mg, 0.1 mg, Intravenous, Q5 Min PRN, Dallas Piña MD  •  lisinopril (PRINIVIL,ZESTRIL) tablet 10 mg, 10 mg, Oral, Q24H, James Phillips MD, 10 mg at 11/09/18 0936  •  morphine injection 2 mg, 2 mg, Intravenous, Once, James Phillips MD  •  [MAR Hold] naloxone (NARCAN) injection 0.4 mg, 0.4 mg, Intravenous, Q5 Min PRN, James Phillips MD  •  ondansetron (ZOFRAN) tablet 4 mg, 4 mg, Oral, Q6H PRN **OR** ondansetron ODT (ZOFRAN-ODT) disintegrating tablet 4 mg, 4 mg, Oral, Q6H PRN **OR** ondansetron (ZOFRAN) injection 4 mg, 4 mg, Intravenous, Q6H PRN, Dallas Piña MD  •  [MAR Hold] pantoprazole (PROTONIX) EC tablet 40 mg, 40 mg, Oral, QAM, James Phillips MD, 40 mg at 11/08/18 0619  •  [MAR Hold] predniSONE (DELTASONE) tablet 10 mg, 10 mg, Oral, Daily, James Phillips MD  •  sodium chloride 0.9 % flush 10 mL, 10 mL, Intravenous, PRN, Jese Santos MD, 10 mL at 11/07/18 2211  •  [MAR Hold] sodium chloride 0.9 % flush 3 mL, 3 mL, Intravenous, Q12H, James Phillips MD  •  [MAR Hold] sodium chloride 0.9 % flush 3-10 mL, 3-10 mL, Intravenous, PRN, James Phillips MD  •  sodium chloride 0.9 % infusion, 50 mL/hr, Intravenous, Continuous, James Phillips MD, Stopped at 11/08/18 1611  •  sodium chloride 0.9 % infusion, 125 mL/hr, Intravenous, Continuous, Dallas Piña MD, Last Rate: 125 mL/hr at 11/09/18 0625, 125 mL/hr at 11/09/18  0625  •  [MAR Hold] tamsulosin (FLOMAX) 24 hr capsule 0.4 mg, 0.4 mg, Oral, Daily, James Phililps MD    Current Outpatient Prescriptions:   •  allopurinol (ZYLOPRIM) 100 MG tablet, Take 100 mg by mouth Daily., Disp: , Rfl:   •  amLODIPine (NORVASC) 10 MG tablet, 10 mg Daily., Disp: , Rfl: 6  •  amLODIPine (NORVASC) 10 MG tablet, Take 10 mg by mouth., Disp: , Rfl:   •  atorvastatin (LIPITOR) 20 MG tablet, Take 20 mg by mouth., Disp: , Rfl:   •  azithromycin (ZITHROMAX) 250 MG tablet, Take 250 mg by mouth 3 (Three) Times a Week. Monday, Wednesday, and Friday, Disp: , Rfl:   •  calcium carbonate (OS-NGOC) 600 MG tablet, Take 600 mg by mouth 2 (Two) Times a Day With Meals., Disp: , Rfl:   •  carvedilol (COREG) 3.125 MG tablet, 3.125 mg 2 (Two) Times a Day With Meals., Disp: , Rfl: 7  •  hydrochlorothiazide (MICROZIDE) 12.5 MG capsule, Take 12.5 mg by mouth Every Morning., Disp: , Rfl:   •  lisinopril (PRINIVIL,ZESTRIL) 40 MG tablet, Take 10 mg by mouth., Disp: , Rfl:   •  predniSONE (DELTASONE) 10 MG tablet, Take 10 mg by mouth Daily., Disp: , Rfl:   •  SPIRIVA HANDIHALER 18 MCG per inhalation capsule, , Disp: , Rfl: 6  •  tamsulosin (FLOMAX) 0.4 MG capsule 24 hr capsule, 1 capsule Daily., Disp: , Rfl: 6  •  alendronate (FOSAMAX) 70 MG tablet, Take 70 mg by mouth Daily., Disp: , Rfl: 6  •  aspirin 81 MG EC tablet, Take 81 mg by mouth Daily., Disp: , Rfl:   •  cyclobenzaprine (FLEXERIL) 5 MG tablet, Take 1 tablet by mouth 3 (Three) Times a Day As Needed for Muscle Spasms., Disp: 9 tablet, Rfl: 0  •  esomeprazole (NEXIUM) 40 MG capsule, Take 40 mg by mouth., Disp: , Rfl:   •  fluticasone-salmeterol (ADVAIR) 500-50 MCG/DOSE DISKUS, Inhale 1 puff., Disp: , Rfl:   •  HYDROcodone-acetaminophen (NORCO) 5-325 MG per tablet, Take 1 tablet by mouth Every 4 (Four) Hours As Needed for Moderate Pain ., Disp: 30 tablet, Rfl: 0  •  PROAIR  (90 Base) MCG/ACT inhaler, , Disp: , Rfl: 6    Results Review:  I have reviewed the labs,  radiology results, and diagnostic studies.    Laboratory Data:     Results from last 7 days  Lab Units 11/08/18  0557 11/07/18 1945   SODIUM mmol/L 135* 133*   POTASSIUM mmol/L 4.2 5.0   CHLORIDE mmol/L 102 95   CO2 mmol/L 27.0 30.0   BUN mg/dL 48* 46*   CREATININE mg/dL 1.12 1.23   GLUCOSE mg/dL 86 216*   CALCIUM mg/dL 8.7 10.7*   BILIRUBIN mg/dL  --  0.8   ALK PHOS U/L  --  99   ALT (SGPT) U/L  --  31   AST (SGOT) U/L  --  26   ANION GAP mmol/L 6.0 8.0     Estimated Creatinine Clearance: 51.9 mL/min (by C-G formula based on SCr of 1.12 mg/dL).    Results from last 7 days  Lab Units 11/07/18 1945   MAGNESIUM mg/dL 1.9   PHOSPHORUS mg/dL 5.2*           Results from last 7 days  Lab Units 11/08/18  0645 11/07/18 1945   WBC 10*3/mm3 16.92* 17.80*   HEMOGLOBIN g/dL 11.9* 15.0   HEMATOCRIT % 34.8* 43.0   PLATELETS 10*3/mm3 105* 132*       Results from last 7 days  Lab Units 11/07/18 1945   INR  1.09       Culture Data:   Blood Culture   Date Value Ref Range Status   11/07/2018 No growth at 24 hours  Preliminary   11/07/2018 No growth at 24 hours  Preliminary     No results found for: URINECX  No results found for: RESPCX  No results found for: WOUNDCX  No results found for: STOOLCX  No components found for: BODYFLD    Radiology Data:   Imaging Results (last 24 hours)     ** No results found for the last 24 hours. **           I have reviewed the patient's current medications.     Assessment/Plan     Hospital Problem List:  Principal Problem:    Inguinal hernia, left  Active Problems:    CAD (coronary artery disease)    Hyperlipidemia    Hypertension    Emphysema of lung (CMS/HCC)    Left lower quadrant pain    Chronic respiratory failure with hypoxia (CMS/HCC)    Left inguinal hernia    1.  Left inguinal hernia  - Patient is has successfully hernia repair and is tolerating regular diet  - Gen. surgery input appreciated that patient can be discharged home today    2.  Emphysema  - Continue his home oxygen at 2 L per  minute by nasal cannula  - Patient does not admit to cough or fever or any findings suggestive of pneumonia or infection at this time.  - Patient has had elevated white count of 14,000 in 2017 so his leukocytosis is likely chronic.  -  Continue albuterol nebulizers and Symbicort  -  He can be discharged home    Continue home medications for hypertension    DVT prophylaxis with sequential compression devices    Discharge Planning: I expect patient will be discharged today    Bert Estrada MD   11/09/18   2:24 PM

## 2018-11-09 NOTE — PLAN OF CARE
Problem: Patient Care Overview  Goal: Plan of Care Review  Outcome: Ongoing (interventions implemented as appropriate)   11/09/18 0506   Coping/Psychosocial   Plan of Care Reviewed With patient   Plan of Care Review   Progress no change   OTHER   Outcome Summary VSS, pain controlled, ambulating in the halls, resting throughout the night, continue to monitor     Goal: Individualization and Mutuality  Outcome: Ongoing (interventions implemented as appropriate)      Problem: Surgery Nonspecified (Adult)  Goal: Signs and Symptoms of Listed Potential Problems Will be Absent, Minimized or Managed (Surgery Nonspecified)  Outcome: Ongoing (interventions implemented as appropriate)    Goal: Anesthesia/Sedation Recovery  Outcome: Outcome(s) achieved Date Met: 11/09/18      Problem: Hospitalized Acutely Ill Older (Adult)  Goal: Signs and Symptoms of Listed Potential Problems Will be Absent, Minimized or Managed (Hospitalized Acutely Ill Older)  Outcome: Outcome(s) achieved Date Met: 11/09/18      Problem: Fall Risk (Adult)  Goal: Identify Related Risk Factors and Signs and Symptoms  Outcome: Ongoing (interventions implemented as appropriate)    Goal: Absence of Fall  Outcome: Ongoing (interventions implemented as appropriate)

## 2018-11-09 NOTE — PROGRESS NOTES
[facility] Medicine Services  INPATIENT PROGRESS NOTE    Length of Stay: 0  Date of Admission: 11/7/2018  Primary Care Physician: Bigg Sky APRN    Subjective     Chief Complaint: Left-sided groin discomfort    HPI:  Patient was admitted for left-sided groin discomfort secondary to large inguinal hernia last night.  Hernia was reduced successfully and patient was planned for surgery for hernia repair today.    This morning patient has no complaints.    Review of Systems   Constitutional: Negative for appetite change, chills, fatigue and fever.   HENT: Negative for congestion and sore throat.    Eyes: Negative for pain and redness.   Respiratory: Negative for cough, chest tightness, shortness of breath and wheezing.    Cardiovascular: Negative for chest pain, palpitations and leg swelling.   Gastrointestinal: Negative for abdominal pain, constipation, diarrhea, nausea and vomiting.   Genitourinary: Negative for dysuria and hematuria.   Musculoskeletal: Negative for arthralgias, joint swelling and neck pain.   Skin: Negative for color change and rash.   Neurological: Negative for dizziness, syncope, light-headedness and headaches.   Hematological: Negative for adenopathy.   Psychiatric/Behavioral: Negative for agitation and confusion. The patient is not nervous/anxious.      Objective    Temp:  [97.1 °F (36.2 °C)-98.2 °F (36.8 °C)] 98 °F (36.7 °C)  Heart Rate:  [63-99] 84  Resp:  [16-20] 16  BP: ()/(49-70) 109/53    Physical Exam   Constitutional: He is oriented to person, place, and time. He appears well-developed and well-nourished. No distress.   HENT:   Head: Normocephalic and atraumatic.   Mouth/Throat: Oropharynx is clear and moist.   2 L of oxygen/min by nasal cannula in situ.   Eyes: Pupils are equal, round, and reactive to light. Conjunctivae and EOM are normal.   Neck: Normal range of motion. Neck supple. No JVD present. No tracheal deviation present. No  thyromegaly present.   Cardiovascular: Normal rate, regular rhythm, normal heart sounds and intact distal pulses.  Exam reveals no gallop and no friction rub.    No murmur heard.  Pulmonary/Chest: Effort normal and breath sounds normal. No stridor. No respiratory distress. He has no wheezes. He has no rales. He exhibits no tenderness.   Abdominal: Soft. Bowel sounds are normal. He exhibits no distension and no mass. There is no tenderness. There is no rebound and no guarding. No hernia.   Reducible left inguinal scrotal hernia present.   Musculoskeletal: Normal range of motion. He exhibits no edema, tenderness or deformity.   Lymphadenopathy:     He has no cervical adenopathy.   Neurological: He is alert and oriented to person, place, and time. No cranial nerve deficit or sensory deficit. He exhibits normal muscle tone. Coordination normal.   Skin: Skin is warm and dry. No rash noted. He is not diaphoretic. No erythema. No pallor.   Psychiatric: He has a normal mood and affect. His behavior is normal. Judgment and thought content normal.       Medication Review:    Current Facility-Administered Medications:   •  [MAR Hold] albuterol (PROVENTIL) nebulizer solution 0.083% 2.5 mg/3mL, 2.5 mg, Nebulization, 4x Daily - RT, James Phillips MD, 2.5 mg at 11/08/18 1107  •  [MAR Hold] allopurinol (ZYLOPRIM) tablet 100 mg, 100 mg, Oral, Daily, James Phillips MD  •  [MAR Hold] aspirin EC tablet 81 mg, 81 mg, Oral, Daily, James Phillips MD  •  [MAR Hold] atorvastatin (LIPITOR) tablet 20 mg, 20 mg, Oral, Daily, James Phillips MD  •  [MAR Hold] budesonide-formoterol (SYMBICORT) 160-4.5 MCG/ACT inhaler 2 puff, 2 puff, Inhalation, BID - RT, James Phillips MD, 2 puff at 11/08/18 0811  •  bupivacaine-EPINEPHrine PF (MARCAINE w/EPI) 0.5% -1:[id number] injection, , , PRN, PiñaDallas MD, 10 mL at 11/08/18 1554  •  carvedilol (COREG) tablet 3.125 mg, 3.125 mg, Oral, BID With Meals, James Phillips MD  •  ceFAZolin in 0.9%  normal saline (ANCEF) IVPB solution 2 g, 2 g, Intravenous, Q8H, Dallas Piña MD, Last Rate: 200 mL/hr at 11/08/18 2036, 2 g at 11/08/18 2036  •  [MAR Hold] cefTRIAXone (ROCEPHIN) 1 g/100 mL 0.9% NS (MBP), 1 g, Intravenous, Q24H, James Phillips MD, Stopped at 11/08/18 0730  •  docusate sodium (COLACE) capsule 100 mg, 100 mg, Oral, BID PRN, Dallas iPña MD  •  HYDROcodone-acetaminophen (NORCO) 5-325 MG per tablet 1 tablet, 1 tablet, Oral, Q4H PRN, Dallas Piña MD  •  HYDROmorphone (DILAUDID) injection 0.5 mg, 0.5 mg, Intravenous, Q2H PRN **AND** naloxone (NARCAN) injection 0.1 mg, 0.1 mg, Intravenous, Q5 Min PRN, Dallas Piña MD  •  lisinopril (PRINIVIL,ZESTRIL) tablet 10 mg, 10 mg, Oral, Q24H, James Phillips MD  •  morphine injection 2 mg, 2 mg, Intravenous, Once, James Phillips MD  •  [MAR Hold] naloxone (NARCAN) injection 0.4 mg, 0.4 mg, Intravenous, Q5 Min PRN, James Phillips MD  •  ondansetron (ZOFRAN) tablet 4 mg, 4 mg, Oral, Q6H PRN **OR** ondansetron ODT (ZOFRAN-ODT) disintegrating tablet 4 mg, 4 mg, Oral, Q6H PRN **OR** ondansetron (ZOFRAN) injection 4 mg, 4 mg, Intravenous, Q6H PRN, Dallas Piña MD  •  [MAR Hold] pantoprazole (PROTONIX) EC tablet 40 mg, 40 mg, Oral, QAM, James Phillips MD, 40 mg at 11/08/18 0619  •  [MAR Hold] predniSONE (DELTASONE) tablet 10 mg, 10 mg, Oral, Daily, James Phillips MD  •  sodium chloride 0.9 % flush 10 mL, 10 mL, Intravenous, PRN, Jese Santos MD, 10 mL at 11/07/18 2211  •  [MAR Hold] sodium chloride 0.9 % flush 3 mL, 3 mL, Intravenous, Q12H, James Phillips MD  •  [MAR Hold] sodium chloride 0.9 % flush 3-10 mL, 3-10 mL, Intravenous, PRN, James Phillips MD  •  sodium chloride 0.9 % infusion, 50 mL/hr, Intravenous, Continuous, James Phillips MD, Stopped at 11/08/18 1611  •  sodium chloride 0.9 % infusion, 125 mL/hr, Intravenous, Continuous, Dallas Piña MD, Last Rate: 125 mL/hr at 11/08/18 2222,  125 mL/hr at 11/08/18 2222  •  [MAR Hold] tamsulosin (FLOMAX) 24 hr capsule 0.4 mg, 0.4 mg, Oral, Daily, James Phillips MD    Results Review:  I have reviewed the labs, radiology results, and diagnostic studies.    Laboratory Data:     Results from last 7 days  Lab Units 11/08/18  0557 11/07/18 1945   SODIUM mmol/L 135* 133*   POTASSIUM mmol/L 4.2 5.0   CHLORIDE mmol/L 102 95   CO2 mmol/L 27.0 30.0   BUN mg/dL 48* 46*   CREATININE mg/dL 1.12 1.23   GLUCOSE mg/dL 86 216*   CALCIUM mg/dL 8.7 10.7*   BILIRUBIN mg/dL  --  0.8   ALK PHOS U/L  --  99   ALT (SGPT) U/L  --  31   AST (SGOT) U/L  --  26   ANION GAP mmol/L 6.0 8.0     Estimated Creatinine Clearance: 51.9 mL/min (by C-G formula based on SCr of 1.12 mg/dL).    Results from last 7 days  Lab Units 11/07/18 1945   MAGNESIUM mg/dL 1.9   PHOSPHORUS mg/dL 5.2*           Results from last 7 days  Lab Units 11/08/18  0645 11/07/18 1945   WBC 10*3/mm3 16.92* 17.80*   HEMOGLOBIN g/dL 11.9* 15.0   HEMATOCRIT % 34.8* 43.0   PLATELETS 10*3/mm3 105* 132*       Results from last 7 days  Lab Units 11/07/18 1945   INR  1.09       Culture Data:   Blood Culture   Date Value Ref Range Status   11/07/2018 No growth at 24 hours  Preliminary   11/07/2018 No growth at 24 hours  Preliminary     No results found for: URINECX  No results found for: RESPCX  No results found for: WOUNDCX  No results found for: STOOLCX  No components found for: BODYFLD    Radiology Data:   Imaging Results (last 24 hours)     ** No results found for the last 24 hours. **           I have reviewed the patient's current medications.     Assessment/Plan     Hospital Problem List:  Principal Problem:    Inguinal hernia, left  Active Problems:    CAD (coronary artery disease)    Hyperlipidemia    Hypertension    Emphysema of lung (CMS/HCC)    Left lower quadrant pain    Chronic respiratory failure with hypoxia (CMS/HCC)    Left inguinal hernia    1.  Left inguinal hernia  - Patient is going for hernia repair  today  - Gen. surgery input appreciated    2.  Emphysema  - Continue her home oxygen at 2 L per minute by nasal cannula  - Patient does not admit to cough or fever or any findings suggestive of pneumonia  - Patient has had elevated white count to 14,000 in 2017 shows elevated white count at this time is chronic.  - Watch off antibiotics  - Continue albuterol nebulizers and Symbicort  - Incentive spirometry pre-and post operatively    Continue home medications for hypertension    DVT prophylaxis with sequential compression devices        Discharge Planning: I expect patient will be discharging in next 1-2 days    Bert Estrada MD   11/08/18   10:24 PM

## 2018-11-11 NOTE — DISCHARGE SUMMARY
Discharge Summary  Date: 11/9/18  Service: General Surgery  Attending: Dallas Piña    Procedures: Open left inguinal hernia repair with mesh  Consults: General medicine  Discharge Diagnoses: Large left inguinal hernia, COPD  Hospital Course: The patient was admitted to the hospitalist service for medical management of his comorbid conditions.  He was then taken the operating room for an uneventful open left inguinal hernia repair with mesh.  He tolerated this procedure well and was stable on postoperative day one.  Therefore, he was deemed appropriate for discharge home.    Condition at time of Discharge: Stable  Discharge Diet: Regular      Discharge Medications:     Your medication list      START taking these medications      Instructions Last Dose Given Next Dose Due   HYDROcodone-acetaminophen 5-325 MG per tablet  Commonly known as:  NORCO      Take 1 tablet by mouth Every 4 (Four) Hours As Needed for Moderate Pain .          CONTINUE taking these medications      Instructions Last Dose Given Next Dose Due   alendronate 70 MG tablet  Commonly known as:  FOSAMAX      Take 70 mg by mouth Daily.       allopurinol 100 MG tablet  Commonly known as:  ZYLOPRIM      Take 100 mg by mouth Daily.       amLODIPine 10 MG tablet  Commonly known as:  NORVASC      Take 10 mg by mouth.       amLODIPine 10 MG tablet  Commonly known as:  NORVASC      10 mg Daily.       aspirin 81 MG EC tablet      Take 81 mg by mouth Daily.       atorvastatin 20 MG tablet  Commonly known as:  LIPITOR      Take 20 mg by mouth.       azithromycin 250 MG tablet  Commonly known as:  ZITHROMAX      Take 250 mg by mouth 3 (Three) Times a Week. Monday, Wednesday, and Friday       calcium carbonate 600 MG tablet  Commonly known as:  OS-NGOC      Take 600 mg by mouth 2 (Two) Times a Day With Meals.       carvedilol 3.125 MG tablet  Commonly known as:  COREG      3.125 mg 2 (Two) Times a Day With Meals.       cyclobenzaprine 5 MG tablet  Commonly known  as:  FLEXERIL      Take 1 tablet by mouth 3 (Three) Times a Day As Needed for Muscle Spasms.       fluticasone-salmeterol 500-50 MCG/DOSE DISKUS  Commonly known as:  ADVAIR      Inhale 1 puff.       hydrochlorothiazide 12.5 MG capsule  Commonly known as:  MICROZIDE      Take 12.5 mg by mouth Every Morning.       lisinopril 40 MG tablet  Commonly known as:  PRINIVIL,ZESTRIL      Take 10 mg by mouth.       NEXIUM 40 MG capsule  Generic drug:  esomeprazole      Take 40 mg by mouth.       predniSONE 10 MG tablet  Commonly known as:  DELTASONE      Take 10 mg by mouth Daily.       PROAIR  (90 Base) MCG/ACT inhaler  Generic drug:  albuterol           SPIRIVA HANDIHALER 18 MCG per inhalation capsule  Generic drug:  tiotropium           tamsulosin 0.4 MG capsule 24 hr capsule  Commonly known as:  FLOMAX      1 capsule Daily.          STOP taking these medications    acetaminophen 500 MG tablet  Commonly known as:  TYLENOL              Where to Get Your Medications      You can get these medications from any pharmacy    Bring a paper prescription for each of these medications  · HYDROcodone-acetaminophen 5-325 MG per tablet         Activity Instructions     Discharge Activity      1) No driving while taking narcotics.   2) May shower, no tub bath or submerging incisions  3) Do not lift / push / pull more than 15 lbs.            Your Scheduled Appointments    Nov 16, 2018  2:30 PM CST  Post-Op with Dallas Piña MD  [facility] GENERAL SURGERY (--) [address] [place] [address] DR  [facility] [address] [place] [id number] [phone]    Additional instructions:      LELIA La   Thursday November 15th 10:15am                            This document has been electronically signed by Dallas Piña MD on November 11, 2018 11:24 AM

## 2018-11-11 NOTE — OP NOTE
Operative Note    [place] Stephenson Lisa  11/7/2018 - 11/8/2018    Pre-op Diagnosis:   Left inguinal hernia [K40.90]    Post-op Diagnosis:     Post-Op Diagnosis Codes:     * Left inguinal hernia [K40.90]    Procedure/CPT® Codes:      Procedure(s):  LEFT INGUINAL HERNIA REPAIR WITH MESH    Surgeon(s):  Dallas Piña MD    Anesthesia: Choice    Staff:   Circulator: Maria Isabel Monroe RN; Kaitlyn Fernandez RN  Scrub Person: Nicky Butler; Michele Rm  Assistant: Tiffanie Martins CSA    Estimated Blood Loss: 5 mL    Specimens:                None      Drains:      Findings: large indirect left inguinal hernia containing small bowel which appeared nonobstructed and viable    Complications: None    Indication: Large left inguinal hernia with recent difficulty in reducing the hernia    Operative Note:    The patient was seen, marked, and consented prior to surgery.  He was then brought to the operating room and placed in supine position on the OR table.  Gen. anesthetic was administered and the patient had a laryngeal mask airway placed and secured by anesthesia.  A briefing was then performed.  The left groin was then prepped and draped in normal sterile fashion including the scrotum.  A timeout was then performed.    Following timeout gentle manipulation was used to reduce the patient's hernia.  The bony landmarks of the anterior superior iliac spine and pubic tubercle were palpated and marked on the skin.  A skin incision was outlined between the structures.  Local anesthetic was injected in this area.  A skin incision was then made and carried down through the subcutaneous tissues.  A single vein was encountered during the course of this portion of the dissection, it was dissected free from the surrounding tissues, clamped on each side, divided and controlled with Vicryl ties.  Dissection then continued down through Jayme's fascia until the aponeurosis of the external oblique fascia was  identified.  The external ring was then identified as well.  A scalpel was used to create a small neck in the external oblique fascia.  The fascia was then clear posteriorly using Metzenbaum scissors all the way down to the level of the external ring.  The fascia was then incised using the scissors.  The fascia was then clear posteriorly up towards the anterior superior iliac spine.  It was then opened laterally as well using scissors.    Hemostats were then placed on the fascial leaflets.  Blunt dissection was then used to develop this plane so that superiorly the internal oblique aponeurosis was visualized and inferiorly the shelving edge of the inguinal ligament was visualized.  A separate retaining retractor was then placed for further aid in visualization.  Blunt dissection was then carried out at the level of the pubic tubercle to encircle the entire spermatic cord and the large hernia sac.  The Penrose drain was then placed around the structures.  The remainder of the pelvic floor was inspected and appeared to be intact.    The spermatic cord and hernia sac were then visualized.  The patient had a large hernia sac which appeared to contain small intestines.  The cord structures were bluntly dissected away from the hernia sac which was then fully encircled with a finger.  It was then bluntly stripped away from the cord structures all the way to the apex of the hernia sac.   The hernia sac was then opened and the small bowel and she contained were examined and appeared to be viable.  The small intestines were then reduced through the internal ring.  The hernia sac was then clamped near the in paternal ring and divided.  It was ligated using a 2-0 Vicryl tie.    Interrupted PDS sutures were then used to repair the pelvic floor to keep the remainder of the hernia sac out of the operative field.  A polypropylene mesh was then brought on the field and cut to appropriate size and shape with a keyhole incision for  passage of the spermatic cord structures.  It was then secured medially near the pubic tubercle using a 0 PDS suture.  Then, in interrupted fashion was secured superiorly to the internal oblique aponeurosis and inferiorly to the shelving edge of the inguinal ligament.  The 2 tails of the mesh were then brought around the spermatic cord and secured one another as well as to the internal oblique fascia.  The wound was then irrigated.  The mesh appeared to be in good position.  The Penrose drain was then removed.  The spermatic cord structures were visualized and palpated and appeared to be intact.  The wound was then closed in layers using 2-0 Vicryl close the external oblique fascia, 3-0 Vicryl close Jayme's fascia, 4-0 Vicryl to close the skin.  Skin affix was then placed over the incision.  The patient was then awakened and returned to the recovery room in good condition.          This document has been electronically signed by Dallas Piña MD on November 11, 2018 12:43 PM        Dallas Piña MD     Date: 11/11/2018  Time: 12:36 PM

## 2018-11-12 LAB
BACTERIA SPEC AEROBE CULT: NORMAL
BACTERIA SPEC AEROBE CULT: NORMAL

## 2018-11-19 ENCOUNTER — OFFICE VISIT (OUTPATIENT)
Dept: SURGERY | Facility: CLINIC | Age: 78
End: 2018-11-19

## 2018-11-19 VITALS
WEIGHT: 142 LBS | TEMPERATURE: 98.5 F | BODY MASS INDEX: 21.52 KG/M2 | HEIGHT: 68 IN | SYSTOLIC BLOOD PRESSURE: 124 MMHG | HEART RATE: 77 BPM | DIASTOLIC BLOOD PRESSURE: 80 MMHG

## 2018-11-19 DIAGNOSIS — Z09 FOLLOW UP: Primary | ICD-10-CM

## 2018-11-19 PROCEDURE — 99024 POSTOP FOLLOW-UP VISIT: CPT | Performed by: SURGERY

## 2018-11-21 NOTE — PROGRESS NOTES
CHIEF COMPLAINT:    Chief Complaint   Patient presents with   • Post-op     Open left inguinal hernia repair with mesh on 11/8/18.       HISTORY OF PRESENT ILLNESS:    [place] Gonzalez is a 78 y.o. male who underwent open left inguinal hernia repair with mesh on 11/8/2018.  He returns today for follow-up.  He states that he has been doing well at home.  He has mild left groin pain.  He has noted some bruising in this area as well.  He is having regular bowel function and urinating without difficulty.    EXAM:  Vitals:    11/19/18 1112   BP: 124/80   Pulse: 77   Temp: 98.5 °F (36.9 °C)         Abdomen soft, left groin incision healing up properly, bruising of left groin, penis, scrotum    ASSESSMENT:    Status post open left inguinal hernia repair    PLAN:    Overall he appears to be doing well.  I will see him back in 2 weeks to recheck the bruising.          This document has been electronically signed by Dallas Piña MD on November 21, 2018 10:54 AM

## 2018-12-03 ENCOUNTER — OFFICE VISIT (OUTPATIENT)
Dept: SURGERY | Facility: CLINIC | Age: 78
End: 2018-12-03

## 2018-12-03 VITALS
HEART RATE: 88 BPM | HEIGHT: 68 IN | WEIGHT: 142 LBS | BODY MASS INDEX: 21.52 KG/M2 | SYSTOLIC BLOOD PRESSURE: 116 MMHG | DIASTOLIC BLOOD PRESSURE: 66 MMHG | TEMPERATURE: 97.2 F

## 2018-12-03 DIAGNOSIS — Z09 FOLLOW UP: Primary | ICD-10-CM

## 2018-12-03 PROCEDURE — 99024 POSTOP FOLLOW-UP VISIT: CPT | Performed by: SURGERY

## 2018-12-03 RX ORDER — ALBUTEROL SULFATE 90 UG/1
2 AEROSOL, METERED RESPIRATORY (INHALATION)
COMMUNITY

## 2018-12-03 NOTE — PROGRESS NOTES
CHIEF COMPLAINT:    Chief Complaint   Patient presents with   • Post-op Follow-up     2 Wk. kody- S/P Open LIH repair with mesh 11-8-18.       HISTORY OF PRESENT ILLNESS:    [place] Gonzalez is a 78 y.o. male who underwent prior open left inguinal hernia repair.  He is here today for additional follow-up.  He notes no issues at home. Is eating and drinking well at home, having regular bowel function. Reports no fevers or chills.      EXAM:  Vitals:    12/03/18 1018   BP: 116/66   Pulse: 88   Temp: 97.2 °F (36.2 °C)         Well-healed left groin incision, no palpable hernia, minimal resolving bruising    ASSESSMENT:    Status post open left inguinal hernia repair with mesh    PLAN:    Overall he appears to be well-healed.  He can see us as needed.          This document has been electronically signed by Dallas Piña MD on December 3, 2018 10:53 AM

## 2019-04-24 ENCOUNTER — TELEPHONE (OUTPATIENT)
Dept: CARDIAC SURGERY | Facility: CLINIC | Age: 79
End: 2019-04-24

## 2019-08-07 ENCOUNTER — TELEPHONE (OUTPATIENT)
Dept: CARDIAC SURGERY | Facility: CLINIC | Age: 79
End: 2019-08-07

## 2019-08-07 NOTE — TELEPHONE ENCOUNTER
Called Mr. Gonzalez to see if he has already had the testing done ordered by Dr. Buenrostro or if he was ready to schedule them. No answer unable to leave VM

## 2019-11-07 ENCOUNTER — TELEPHONE (OUTPATIENT)
Dept: CARDIAC SURGERY | Facility: CLINIC | Age: 79
End: 2019-11-07

## 2019-11-07 NOTE — TELEPHONE ENCOUNTER
I called Mr. Gonzalez regarding his order for CTA chest with Dr. Buenrostro, still no answer unable to leave vm.

## (undated) DEVICE — SOL IRR NACL 0.9PCT BT 1000ML

## (undated) DEVICE — DRN PENRS 1/2X18IN LTX

## (undated) DEVICE — GLV SURG SENSICARE PI LF PF 8 GRN STRL

## (undated) DEVICE — GOWN,AURORA,NOREINF,RAGLAN,XL,STERILE: Brand: MEDLINE

## (undated) DEVICE — SKIN AFFIX SURG ADHESIVE 72/CS 0.55ML: Brand: MEDLINE

## (undated) DEVICE — BLD CLIP UNIV SURG GRY

## (undated) DEVICE — PK MAJ PROC LF 60

## (undated) DEVICE — SUT PDS 2/0 SH 27IN Z317H

## (undated) DEVICE — SUT VIC 2/0 SH 27IN

## (undated) DEVICE — GLV SURG TRIUMPH LT PF LTX 7.5 STRL

## (undated) DEVICE — SUT VIC 3/0 SH 27IN J416H

## (undated) DEVICE — STERILE POLYISOPRENE POWDER-FREE SURGICAL GLOVES WITH EMOLLIENT COATING: Brand: PROTEXIS

## (undated) DEVICE — SUT PDS 0 CT2 27IN DYED Z334H

## (undated) DEVICE — GLV SURG SENSICARE PI PF LF 7 GRN STRL

## (undated) DEVICE — GLV SURG TRIUMPH LT PF LTX 6 STRL

## (undated) DEVICE — GLV SURG TRIUMPH LT PF LTX 6.5 STRL

## (undated) DEVICE — ANTIBACTERIAL UNDYED BRAIDED (POLYGLACTIN 910), SYNTHETIC ABSORBABLE SUTURE: Brand: COATED VICRYL

## (undated) DEVICE — GLV SURG SENSICARE POLYISPRN W/ALOE PF LF 6 GRN STRL

## (undated) DEVICE — STERILE POLYISOPRENE POWDER-FREE SURGICAL GLOVES: Brand: PROTEXIS

## (undated) DEVICE — SUT VIC 3/0 TIES 18IN J110T